# Patient Record
Sex: FEMALE | Race: WHITE | NOT HISPANIC OR LATINO | Employment: FULL TIME | URBAN - METROPOLITAN AREA
[De-identification: names, ages, dates, MRNs, and addresses within clinical notes are randomized per-mention and may not be internally consistent; named-entity substitution may affect disease eponyms.]

---

## 2018-04-09 ENCOUNTER — OFFICE VISIT (OUTPATIENT)
Dept: OPHTHALMOLOGY | Facility: CLINIC | Age: 73
End: 2018-04-09
Payer: COMMERCIAL

## 2018-04-09 DIAGNOSIS — H25.13 CATARACT, NUCLEAR SCLEROTIC SENILE, BILATERAL: Primary | ICD-10-CM

## 2018-04-09 DIAGNOSIS — H52.03 HYPEROPIA, BILATERAL: ICD-10-CM

## 2018-04-09 DIAGNOSIS — H52.4 BILATERAL PRESBYOPIA: ICD-10-CM

## 2018-04-09 PROCEDURE — 99999 PR PBB SHADOW E&M-EST. PATIENT-LVL I: CPT | Mod: PBBFAC,,, | Performed by: OPTOMETRIST

## 2018-04-09 PROCEDURE — 92004 COMPRE OPH EXAM NEW PT 1/>: CPT | Mod: S$GLB,,, | Performed by: OPTOMETRIST

## 2018-04-09 PROCEDURE — 92015 DETERMINE REFRACTIVE STATE: CPT | Mod: S$GLB,,, | Performed by: OPTOMETRIST

## 2018-04-09 RX ORDER — LOSARTAN POTASSIUM 50 MG/1
50 TABLET ORAL DAILY
COMMUNITY
End: 2022-06-08

## 2018-04-09 NOTE — PROGRESS NOTES
HPI     Eye strain on the computer. Last eye exam 08/25/2014 TRF. Update glasses   RX. No dilation today.    Last edited by Sofiya Graham on 4/9/2018 10:53 AM. (History)            Assessment /Plan     For exam results, see Encounter Report.    Cataract, nuclear sclerotic senile, bilateral    Hyperopia, bilateral    Bilateral presbyopia      Moderate cataracts OU, not surgical    Dispense Final Rx for glasses.  RTC 1 year  Discussed above and answered questions.

## 2021-04-20 ENCOUNTER — OFFICE VISIT (OUTPATIENT)
Dept: OPHTHALMOLOGY | Facility: CLINIC | Age: 76
End: 2021-04-20
Payer: COMMERCIAL

## 2021-04-20 DIAGNOSIS — H52.7 REFRACTIVE ERRORS: ICD-10-CM

## 2021-04-20 DIAGNOSIS — H25.13 CATARACT, NUCLEAR SCLEROTIC SENILE, BILATERAL: Primary | ICD-10-CM

## 2021-04-20 DIAGNOSIS — H04.123 DRY EYES, BILATERAL: ICD-10-CM

## 2021-04-20 PROCEDURE — 92004 PR EYE EXAM, NEW PATIENT,COMPREHESV: ICD-10-PCS | Mod: S$GLB,,, | Performed by: OPTOMETRIST

## 2021-04-20 PROCEDURE — 92015 PR REFRACTION: ICD-10-PCS | Mod: S$GLB,,, | Performed by: OPTOMETRIST

## 2021-04-20 PROCEDURE — 92004 COMPRE OPH EXAM NEW PT 1/>: CPT | Mod: S$GLB,,, | Performed by: OPTOMETRIST

## 2021-04-20 PROCEDURE — 99999 PR PBB SHADOW E&M-NEW PATIENT-LVL II: ICD-10-PCS | Mod: PBBFAC,,, | Performed by: OPTOMETRIST

## 2021-04-20 PROCEDURE — 92015 DETERMINE REFRACTIVE STATE: CPT | Mod: S$GLB,,, | Performed by: OPTOMETRIST

## 2021-04-20 PROCEDURE — 99999 PR PBB SHADOW E&M-NEW PATIENT-LVL II: CPT | Mod: PBBFAC,,, | Performed by: OPTOMETRIST

## 2021-05-04 ENCOUNTER — OFFICE VISIT (OUTPATIENT)
Dept: OPHTHALMOLOGY | Facility: CLINIC | Age: 76
End: 2021-05-04
Payer: COMMERCIAL

## 2021-05-04 DIAGNOSIS — H52.7 REFRACTIVE ERRORS: Primary | ICD-10-CM

## 2021-05-04 PROCEDURE — 99499 NO LOS: ICD-10-PCS | Mod: S$GLB,,, | Performed by: OPTOMETRIST

## 2021-05-04 PROCEDURE — 99499 UNLISTED E&M SERVICE: CPT | Mod: S$GLB,,, | Performed by: OPTOMETRIST

## 2021-10-27 ENCOUNTER — OFFICE VISIT (OUTPATIENT)
Dept: CARDIOLOGY | Facility: CLINIC | Age: 76
End: 2021-10-27
Payer: COMMERCIAL

## 2021-10-27 ENCOUNTER — HOSPITAL ENCOUNTER (OUTPATIENT)
Dept: RADIOLOGY | Facility: HOSPITAL | Age: 76
Discharge: HOME | End: 2021-10-27
Attending: INTERNAL MEDICINE
Payer: COMMERCIAL

## 2021-10-27 ENCOUNTER — OFFICE VISIT (OUTPATIENT)
Dept: PULMONOLOGY | Facility: CLINIC | Age: 76
End: 2021-10-27
Payer: COMMERCIAL

## 2021-10-27 ENCOUNTER — HOSPITAL ENCOUNTER (OUTPATIENT)
Dept: CARDIOLOGY | Facility: HOSPITAL | Age: 76
Discharge: HOME | End: 2021-10-27
Attending: INTERNAL MEDICINE
Payer: COMMERCIAL

## 2021-10-27 VITALS
SYSTOLIC BLOOD PRESSURE: 110 MMHG | DIASTOLIC BLOOD PRESSURE: 76 MMHG | WEIGHT: 149.06 LBS | OXYGEN SATURATION: 96 % | RESPIRATION RATE: 17 BRPM | HEART RATE: 92 BPM

## 2021-10-27 VITALS
SYSTOLIC BLOOD PRESSURE: 116 MMHG | HEART RATE: 92 BPM | WEIGHT: 149.06 LBS | OXYGEN SATURATION: 96 % | DIASTOLIC BLOOD PRESSURE: 68 MMHG

## 2021-10-27 DIAGNOSIS — R07.9 CHEST PAIN, UNSPECIFIED TYPE: ICD-10-CM

## 2021-10-27 DIAGNOSIS — I10 HYPERTENSION, UNSPECIFIED TYPE: ICD-10-CM

## 2021-10-27 DIAGNOSIS — E78.5 HYPERLIPIDEMIA, UNSPECIFIED HYPERLIPIDEMIA TYPE: ICD-10-CM

## 2021-10-27 DIAGNOSIS — E78.5 DYSLIPIDEMIA: ICD-10-CM

## 2021-10-27 DIAGNOSIS — R05.3 CHRONIC COUGH: ICD-10-CM

## 2021-10-27 DIAGNOSIS — R05.3 CHRONIC COUGH: Primary | ICD-10-CM

## 2021-10-27 DIAGNOSIS — R06.09 DYSPNEA ON EXERTION: ICD-10-CM

## 2021-10-27 DIAGNOSIS — R07.9 CHEST PAIN, UNSPECIFIED TYPE: Primary | ICD-10-CM

## 2021-10-27 DIAGNOSIS — R06.02 SOBOE (SHORTNESS OF BREATH ON EXERTION): ICD-10-CM

## 2021-10-27 PROCEDURE — 71046 XR CHEST PA AND LATERAL: ICD-10-PCS | Mod: 26,,, | Performed by: RADIOLOGY

## 2021-10-27 PROCEDURE — 71046 X-RAY EXAM CHEST 2 VIEWS: CPT | Mod: TC

## 2021-10-27 PROCEDURE — 1160F RVW MEDS BY RX/DR IN RCRD: CPT | Mod: CPTII,S$GLB,, | Performed by: INTERNAL MEDICINE

## 2021-10-27 PROCEDURE — 99999 PR PBB SHADOW E&M-EST. PATIENT-LVL III: ICD-10-PCS | Mod: PBBFAC,,, | Performed by: INTERNAL MEDICINE

## 2021-10-27 PROCEDURE — 3288F FALL RISK ASSESSMENT DOCD: CPT | Mod: CPTII,S$GLB,, | Performed by: INTERNAL MEDICINE

## 2021-10-27 PROCEDURE — 3078F DIAST BP <80 MM HG: CPT | Mod: CPTII,S$GLB,, | Performed by: INTERNAL MEDICINE

## 2021-10-27 PROCEDURE — 99204 OFFICE O/P NEW MOD 45 MIN: CPT | Mod: S$GLB,,, | Performed by: INTERNAL MEDICINE

## 2021-10-27 PROCEDURE — 3074F SYST BP LT 130 MM HG: CPT | Mod: CPTII,S$GLB,, | Performed by: INTERNAL MEDICINE

## 2021-10-27 PROCEDURE — 71046 X-RAY EXAM CHEST 2 VIEWS: CPT | Mod: 26,,, | Performed by: RADIOLOGY

## 2021-10-27 PROCEDURE — 1160F PR REVIEW ALL MEDS BY PRESCRIBER/CLIN PHARMACIST DOCUMENTED: ICD-10-PCS | Mod: CPTII,S$GLB,, | Performed by: INTERNAL MEDICINE

## 2021-10-27 PROCEDURE — 3074F PR MOST RECENT SYSTOLIC BLOOD PRESSURE < 130 MM HG: ICD-10-PCS | Mod: CPTII,S$GLB,, | Performed by: INTERNAL MEDICINE

## 2021-10-27 PROCEDURE — 1101F PT FALLS ASSESS-DOCD LE1/YR: CPT | Mod: CPTII,S$GLB,, | Performed by: INTERNAL MEDICINE

## 2021-10-27 PROCEDURE — 1159F PR MEDICATION LIST DOCUMENTED IN MEDICAL RECORD: ICD-10-PCS | Mod: CPTII,S$GLB,, | Performed by: INTERNAL MEDICINE

## 2021-10-27 PROCEDURE — 99999 PR PBB SHADOW E&M-EST. PATIENT-LVL III: CPT | Mod: PBBFAC,,, | Performed by: INTERNAL MEDICINE

## 2021-10-27 PROCEDURE — 3078F PR MOST RECENT DIASTOLIC BLOOD PRESSURE < 80 MM HG: ICD-10-PCS | Mod: CPTII,S$GLB,, | Performed by: INTERNAL MEDICINE

## 2021-10-27 PROCEDURE — 99204 PR OFFICE/OUTPT VISIT, NEW, LEVL IV, 45-59 MIN: ICD-10-PCS | Mod: S$GLB,,, | Performed by: INTERNAL MEDICINE

## 2021-10-27 PROCEDURE — 1101F PR PT FALLS ASSESS DOC 0-1 FALLS W/OUT INJ PAST YR: ICD-10-PCS | Mod: CPTII,S$GLB,, | Performed by: INTERNAL MEDICINE

## 2021-10-27 PROCEDURE — 93010 ELECTROCARDIOGRAM REPORT: CPT | Mod: ,,, | Performed by: INTERNAL MEDICINE

## 2021-10-27 PROCEDURE — 1159F MED LIST DOCD IN RCRD: CPT | Mod: CPTII,S$GLB,, | Performed by: INTERNAL MEDICINE

## 2021-10-27 PROCEDURE — 3288F PR FALLS RISK ASSESSMENT DOCUMENTED: ICD-10-PCS | Mod: CPTII,S$GLB,, | Performed by: INTERNAL MEDICINE

## 2021-10-27 PROCEDURE — 93005 ELECTROCARDIOGRAM TRACING: CPT

## 2021-10-27 PROCEDURE — 93010 EKG 12-LEAD: ICD-10-PCS | Mod: ,,, | Performed by: INTERNAL MEDICINE

## 2021-10-27 RX ORDER — ALBUTEROL SULFATE 0.83 MG/ML
2.5 SOLUTION RESPIRATORY (INHALATION) EVERY 6 HOURS PRN
COMMUNITY
Start: 2021-09-20

## 2021-10-28 ENCOUNTER — HOSPITAL ENCOUNTER (OUTPATIENT)
Dept: CARDIOLOGY | Facility: HOSPITAL | Age: 76
Discharge: HOME | End: 2021-10-28
Attending: INTERNAL MEDICINE
Payer: COMMERCIAL

## 2021-10-28 VITALS — BODY MASS INDEX: 24.83 KG/M2 | HEIGHT: 65 IN | WEIGHT: 149 LBS

## 2021-10-28 DIAGNOSIS — I10 HYPERTENSION, UNSPECIFIED TYPE: ICD-10-CM

## 2021-10-28 DIAGNOSIS — R07.9 CHEST PAIN, UNSPECIFIED TYPE: ICD-10-CM

## 2021-10-28 DIAGNOSIS — R06.09 DYSPNEA ON EXERTION: ICD-10-CM

## 2021-10-28 DIAGNOSIS — E78.5 HYPERLIPIDEMIA, UNSPECIFIED HYPERLIPIDEMIA TYPE: ICD-10-CM

## 2021-10-28 LAB
AORTIC ROOT ANNULUS: 3.05 CM
ASCENDING AORTA: 3.78 CM
AV INDEX (PROSTH): 0.86
AV MEAN GRADIENT: 5 MMHG
AV PEAK GRADIENT: 10 MMHG
AV VALVE AREA: 3.02 CM2
AV VELOCITY RATIO: 0.86
BSA FOR ECHO PROCEDURE: 1.76 M2
CV ECHO LV RWT: 0.62 CM
DOP CALC AO PEAK VEL: 1.55 M/S
DOP CALC AO VTI: 30.06 CM
DOP CALC LVOT AREA: 3.5 CM2
DOP CALC LVOT DIAMETER: 2.11 CM
DOP CALC LVOT PEAK VEL: 1.33 M/S
DOP CALC LVOT STROKE VOLUME: 90.83 CM3
DOP CALC RVOT PEAK VEL: 0.68 M/S
DOP CALC RVOT VTI: 14.07 CM
DOP CALCLVOT PEAK VEL VTI: 25.99 CM
E WAVE DECELERATION TIME: 303.81 MSEC
E/A RATIO: 0.68
E/E' RATIO: 5 M/S
ECHO LV POSTERIOR WALL: 1.26 CM (ref 0.6–1.1)
EJECTION FRACTION: 60 %
FRACTIONAL SHORTENING: 33 % (ref 28–44)
INTERVENTRICULAR SEPTUM: 1.3 CM (ref 0.6–1.1)
IVRT: 99.9 MSEC
LA MAJOR: 5.61 CM
LA MINOR: 5.49 CM
LA WIDTH: 2.61 CM
LEFT ATRIUM SIZE: 2.93 CM
LEFT ATRIUM VOLUME INDEX: 20.6 ML/M2
LEFT ATRIUM VOLUME: 36.07 CM3
LEFT INTERNAL DIMENSION IN SYSTOLE: 2.7 CM (ref 2.1–4)
LEFT VENTRICLE DIASTOLIC VOLUME INDEX: 41.47 ML/M2
LEFT VENTRICLE DIASTOLIC VOLUME: 72.58 ML
LEFT VENTRICLE MASS INDEX: 106 G/M2
LEFT VENTRICLE SYSTOLIC VOLUME INDEX: 15.5 ML/M2
LEFT VENTRICLE SYSTOLIC VOLUME: 27.14 ML
LEFT VENTRICULAR INTERNAL DIMENSION IN DIASTOLE: 4.06 CM (ref 3.5–6)
LEFT VENTRICULAR MASS: 186.3 G
LV LATERAL E/E' RATIO: 4.55 M/S
LV SEPTAL E/E' RATIO: 5.56 M/S
MV PEAK A VEL: 0.73 M/S
MV PEAK E VEL: 0.5 M/S
MV STENOSIS PRESSURE HALF TIME: 88.11 MS
MV VALVE AREA P 1/2 METHOD: 2.5 CM2
PISA TR MAX VEL: 2.41 M/S
PV MEAN GRADIENT: 1 MMHG
PV PEAK VELOCITY: 0.78 CM/S
RA MAJOR: 5.1 CM
RA PRESSURE: 3 MMHG
RA WIDTH: 2.96 CM
RIGHT VENTRICULAR END-DIASTOLIC DIMENSION: 3.69 CM
SINUS: 3.26 CM
STJ: 3.37 CM
TDI LATERAL: 0.11 M/S
TDI SEPTAL: 0.09 M/S
TDI: 0.1 M/S
TR MAX PG: 23 MMHG
TRICUSPID ANNULAR PLANE SYSTOLIC EXCURSION: 1.76 CM
TV REST PULMONARY ARTERY PRESSURE: 26 MMHG

## 2021-10-28 PROCEDURE — 93306 ECHO (CUPID ONLY): ICD-10-PCS | Mod: 26,,, | Performed by: INTERNAL MEDICINE

## 2021-10-28 PROCEDURE — 93306 TTE W/DOPPLER COMPLETE: CPT | Mod: 26,,, | Performed by: INTERNAL MEDICINE

## 2021-10-28 PROCEDURE — 93306 TTE W/DOPPLER COMPLETE: CPT

## 2021-10-29 ENCOUNTER — TELEPHONE (OUTPATIENT)
Dept: CARDIOLOGY | Facility: CLINIC | Age: 76
End: 2021-10-29
Payer: MEDICARE

## 2021-10-30 DIAGNOSIS — R76.8 ELEVATED IGE LEVEL: Primary | ICD-10-CM

## 2021-11-04 ENCOUNTER — LAB VISIT (OUTPATIENT)
Dept: LAB | Facility: HOSPITAL | Age: 76
End: 2021-11-04
Attending: INTERNAL MEDICINE
Payer: MEDICARE

## 2021-11-04 ENCOUNTER — CLINICAL SUPPORT (OUTPATIENT)
Dept: PULMONOLOGY | Facility: CLINIC | Age: 76
End: 2021-11-04
Payer: COMMERCIAL

## 2021-11-04 VITALS — BODY MASS INDEX: 25.48 KG/M2 | HEIGHT: 64 IN | WEIGHT: 149.25 LBS

## 2021-11-04 DIAGNOSIS — E78.5 HYPERLIPIDEMIA, UNSPECIFIED HYPERLIPIDEMIA TYPE: ICD-10-CM

## 2021-11-04 DIAGNOSIS — R06.09 DYSPNEA ON EXERTION: ICD-10-CM

## 2021-11-04 DIAGNOSIS — I10 HYPERTENSION, UNSPECIFIED TYPE: ICD-10-CM

## 2021-11-04 DIAGNOSIS — R06.02 SOBOE (SHORTNESS OF BREATH ON EXERTION): ICD-10-CM

## 2021-11-04 DIAGNOSIS — R05.3 CHRONIC COUGH: ICD-10-CM

## 2021-11-04 DIAGNOSIS — R07.9 CHEST PAIN, UNSPECIFIED TYPE: ICD-10-CM

## 2021-11-04 LAB
CHOLEST SERPL-MCNC: 246 MG/DL (ref 120–199)
CHOLEST/HDLC SERPL: 4.7 {RATIO} (ref 2–5)
HDLC SERPL-MCNC: 52 MG/DL (ref 40–75)
HDLC SERPL: 21.1 % (ref 20–50)
LDLC SERPL CALC-MCNC: 176.4 MG/DL (ref 63–159)
NONHDLC SERPL-MCNC: 194 MG/DL
TRIGL SERPL-MCNC: 88 MG/DL (ref 30–150)

## 2021-11-04 PROCEDURE — 94726 PLETHYSMOGRAPHY LUNG VOLUMES: CPT | Mod: S$GLB,,, | Performed by: INTERNAL MEDICINE

## 2021-11-04 PROCEDURE — 80061 LIPID PANEL: CPT | Performed by: INTERNAL MEDICINE

## 2021-11-04 PROCEDURE — 94618 PULMONARY STRESS TESTING: CPT | Mod: S$GLB,,, | Performed by: INTERNAL MEDICINE

## 2021-11-04 PROCEDURE — 36415 COLL VENOUS BLD VENIPUNCTURE: CPT | Performed by: INTERNAL MEDICINE

## 2021-11-04 PROCEDURE — 94729 DIFFUSING CAPACITY: CPT | Mod: S$GLB,,, | Performed by: INTERNAL MEDICINE

## 2021-11-04 PROCEDURE — 94010 BREATHING CAPACITY TEST: ICD-10-PCS | Mod: S$GLB,,, | Performed by: INTERNAL MEDICINE

## 2021-11-04 PROCEDURE — 94726 PULM FUNCT TST PLETHYSMOGRAP: ICD-10-PCS | Mod: S$GLB,,, | Performed by: INTERNAL MEDICINE

## 2021-11-04 PROCEDURE — 94618 PULMONARY STRESS TESTING: ICD-10-PCS | Mod: S$GLB,,, | Performed by: INTERNAL MEDICINE

## 2021-11-04 PROCEDURE — 94010 BREATHING CAPACITY TEST: CPT | Mod: S$GLB,,, | Performed by: INTERNAL MEDICINE

## 2021-11-04 PROCEDURE — 94729 PR C02/MEMBANE DIFFUSE CAPACITY: ICD-10-PCS | Mod: S$GLB,,, | Performed by: INTERNAL MEDICINE

## 2021-11-05 LAB
BRPFT: NORMAL
DLCO ADJ PRE: 7.11 ML/(MIN*MMHG)
DLCO SINGLE BREATH LLN: 14.24
DLCO SINGLE BREATH PRE REF: 33.6 %
DLCO SINGLE BREATH REF: 19.98
DLCOC SBVA LLN: 2.64
DLCOC SBVA PRE REF: 81.1 %
DLCOC SBVA REF: 4.07
DLCOC SINGLE BREATH LLN: 14.24
DLCOC SINGLE BREATH PRE REF: 35.6 %
DLCOC SINGLE BREATH REF: 19.98
DLCOVA LLN: 2.64
DLCOVA PRE REF: 76.5 %
DLCOVA PRE: 3.12 ML/(MIN*MMHG*L)
DLCOVA REF: 4.07
DLVAADJ PRE: 3.3 ML/(MIN*MMHG*L)
ERV LLN: -16449.47
ERV PRE REF: 150.6 %
ERV REF: 0.53
FEF 25 75 LLN: 0.67
FEF 25 75 PRE REF: 97.7 %
FEF 25 75 REF: 1.59
FEV1 FVC LLN: 63
FEV1 FVC PRE REF: 104.7 %
FEV1 FVC REF: 77
FEV1 LLN: 1.38
FEV1 PRE REF: 72.8 %
FEV1 REF: 1.95
FRCPLETH LLN: 1.88
FRCPLETH PREREF: 105.4 %
FRCPLETH REF: 2.71
FVC LLN: 1.81
FVC PRE REF: 68.8 %
FVC REF: 2.56
IVC PRE: 1.29 L
IVC SINGLE BREATH LLN: 1.81
IVC SINGLE BREATH PRE REF: 50.4 %
IVC SINGLE BREATH REF: 2.56
MVV LLN: 63
MVV PRE REF: 59.5 %
MVV REF: 74
PEF LLN: 3.25
PEF PRE REF: 83.4 %
PEF REF: 4.95
PRE DLCO: 6.71 ML/(MIN*MMHG)
PRE ERV: 0.79 L
PRE FEF 25 75: 1.55 L/S
PRE FET 100: 6.78 SEC
PRE FEV1 FVC: 80.77 %
PRE FEV1: 1.42 L
PRE FRC PL: 2.85 L
PRE FVC: 1.76 L
PRE MVV: 44 L/MIN
PRE PEF: 4.12 L/S
PRE RV: 1.98 L
PRE TLC: 3.78 L
RAW LLN: 3.06
RAW PRE REF: 47.7 %
RAW PRE: 1.46 CMH2O*S/L
RAW REF: 3.06
RV LLN: 1.6
RV PRE REF: 90.9 %
RV REF: 2.18
RVTLC LLN: 36
RVTLC PRE REF: 115.2 %
RVTLC PRE: 52.38 %
RVTLC REF: 45
TLC LLN: 3.91
TLC PRE REF: 77.2 %
TLC REF: 4.9
VA PRE: 2.16 L
VA SINGLE BREATH LLN: 4.75
VA SINGLE BREATH PRE REF: 45.4 %
VA SINGLE BREATH REF: 4.75
VC LLN: 1.81
VC PRE REF: 70.3 %
VC PRE: 1.8 L
VC REF: 2.56
VTGRAWPRE: 2.84 L

## 2021-11-15 ENCOUNTER — TELEPHONE (OUTPATIENT)
Dept: CARDIOLOGY | Facility: CLINIC | Age: 76
End: 2021-11-15
Payer: MEDICARE

## 2021-11-16 ENCOUNTER — TELEPHONE (OUTPATIENT)
Dept: CARDIOLOGY | Facility: CLINIC | Age: 76
End: 2021-11-16
Payer: MEDICARE

## 2021-11-16 DIAGNOSIS — E78.5 HYPERLIPIDEMIA, UNSPECIFIED HYPERLIPIDEMIA TYPE: Primary | ICD-10-CM

## 2021-11-29 ENCOUNTER — HOSPITAL ENCOUNTER (OUTPATIENT)
Dept: CARDIOLOGY | Facility: HOSPITAL | Age: 76
Discharge: HOME | End: 2021-11-29
Attending: INTERNAL MEDICINE
Payer: COMMERCIAL

## 2021-11-29 ENCOUNTER — HOSPITAL ENCOUNTER (OUTPATIENT)
Dept: RADIOLOGY | Facility: HOSPITAL | Age: 76
Discharge: HOME | End: 2021-11-29
Attending: INTERNAL MEDICINE
Payer: COMMERCIAL

## 2021-11-29 DIAGNOSIS — R06.09 DYSPNEA ON EXERTION: ICD-10-CM

## 2021-11-29 DIAGNOSIS — I10 HYPERTENSION, UNSPECIFIED TYPE: ICD-10-CM

## 2021-11-29 DIAGNOSIS — R07.9 CHEST PAIN, UNSPECIFIED TYPE: ICD-10-CM

## 2021-11-29 DIAGNOSIS — E78.5 HYPERLIPIDEMIA, UNSPECIFIED HYPERLIPIDEMIA TYPE: ICD-10-CM

## 2021-11-29 LAB
CV STRESS BASE HR: 79 BPM
DIASTOLIC BLOOD PRESSURE: 74 MMHG
NUC REST EJECTION FRACTION: 88
NUC STRESS EJECTION FRACTION: 86 %
OHS CV CPX 85 PERCENT MAX PREDICTED HEART RATE MALE: 117
OHS CV CPX MAX PREDICTED HEART RATE: 137
OHS CV CPX PATIENT IS FEMALE: 1
OHS CV CPX PATIENT IS MALE: 0
OHS CV CPX PEAK DIASTOLIC BLOOD PRESSURE: 68 MMHG
OHS CV CPX PEAK HEAR RATE: 102 BPM
OHS CV CPX PEAK RATE PRESSURE PRODUCT: NORMAL
OHS CV CPX PEAK SYSTOLIC BLOOD PRESSURE: 121 MMHG
OHS CV CPX PERCENT MAX PREDICTED HEART RATE ACHIEVED: 74
OHS CV CPX RATE PRESSURE PRODUCT PRESENTING: NORMAL
STRESS ECHO POST EXERCISE DUR SEC: 55 SECONDS
SYSTOLIC BLOOD PRESSURE: 136 MMHG

## 2021-11-29 PROCEDURE — 93016 CV STRESS TEST SUPVJ ONLY: CPT | Mod: ,,, | Performed by: INTERNAL MEDICINE

## 2021-11-29 PROCEDURE — 63600175 PHARM REV CODE 636 W HCPCS: Performed by: INTERNAL MEDICINE

## 2021-11-29 PROCEDURE — 93018 CV STRESS TEST I&R ONLY: CPT | Mod: ,,, | Performed by: INTERNAL MEDICINE

## 2021-11-29 PROCEDURE — 78452 STRESS TEST WITH MYOCARDIAL PERFUSION (CUPID ONLY): ICD-10-PCS | Mod: 26,,, | Performed by: INTERNAL MEDICINE

## 2021-11-29 PROCEDURE — 78452 HT MUSCLE IMAGE SPECT MULT: CPT | Mod: 26,,, | Performed by: INTERNAL MEDICINE

## 2021-11-29 PROCEDURE — 93018 STRESS TEST WITH MYOCARDIAL PERFUSION (CUPID ONLY): ICD-10-PCS | Mod: ,,, | Performed by: INTERNAL MEDICINE

## 2021-11-29 PROCEDURE — 78452 HT MUSCLE IMAGE SPECT MULT: CPT

## 2021-11-29 PROCEDURE — 93016 STRESS TEST WITH MYOCARDIAL PERFUSION (CUPID ONLY): ICD-10-PCS | Mod: ,,, | Performed by: INTERNAL MEDICINE

## 2021-11-29 PROCEDURE — A9502 TC99M TETROFOSMIN: HCPCS

## 2021-11-29 PROCEDURE — 93017 CV STRESS TEST TRACING ONLY: CPT

## 2021-11-29 RX ORDER — AMINOPHYLLINE 25 MG/ML
75 INJECTION, SOLUTION INTRAVENOUS
Status: COMPLETED | OUTPATIENT
Start: 2021-11-29 | End: 2021-11-29

## 2021-11-29 RX ORDER — REGADENOSON 0.08 MG/ML
0.4 INJECTION, SOLUTION INTRAVENOUS ONCE
Status: COMPLETED | OUTPATIENT
Start: 2021-11-29 | End: 2021-11-29

## 2021-11-29 RX ADMIN — AMINOPHYLLINE 75 MG: 25 INJECTION, SOLUTION INTRAVENOUS at 01:11

## 2021-11-29 RX ADMIN — REGADENOSON 0.4 MG: 0.08 INJECTION, SOLUTION INTRAVENOUS at 12:11

## 2021-11-30 ENCOUNTER — TELEPHONE (OUTPATIENT)
Dept: CARDIOLOGY | Facility: CLINIC | Age: 76
End: 2021-11-30
Payer: MEDICARE

## 2021-12-14 ENCOUNTER — OFFICE VISIT (OUTPATIENT)
Dept: PULMONOLOGY | Facility: CLINIC | Age: 76
End: 2021-12-14
Payer: COMMERCIAL

## 2021-12-14 ENCOUNTER — LAB VISIT (OUTPATIENT)
Dept: LAB | Facility: HOSPITAL | Age: 76
End: 2021-12-14
Attending: FAMILY MEDICINE
Payer: MEDICARE

## 2021-12-14 ENCOUNTER — IMMUNIZATION (OUTPATIENT)
Dept: PHARMACY | Facility: CLINIC | Age: 76
End: 2021-12-14
Payer: MEDICARE

## 2021-12-14 VITALS
RESPIRATION RATE: 20 BRPM | OXYGEN SATURATION: 98 % | HEIGHT: 63 IN | SYSTOLIC BLOOD PRESSURE: 132 MMHG | WEIGHT: 151.88 LBS | BODY MASS INDEX: 26.91 KG/M2 | DIASTOLIC BLOOD PRESSURE: 74 MMHG | HEART RATE: 80 BPM

## 2021-12-14 DIAGNOSIS — J98.4 RESTRICTIVE LUNG DISEASE: ICD-10-CM

## 2021-12-14 DIAGNOSIS — J84.9 ILD (INTERSTITIAL LUNG DISEASE): ICD-10-CM

## 2021-12-14 DIAGNOSIS — J84.9 ILD (INTERSTITIAL LUNG DISEASE): Primary | ICD-10-CM

## 2021-12-14 DIAGNOSIS — R76.8 ELEVATED IGE LEVEL: ICD-10-CM

## 2021-12-14 DIAGNOSIS — Z23 NEED FOR VACCINATION AGAINST STREPTOCOCCUS PNEUMONIAE USING PNEUMOCOCCAL CONJUGATE VACCINE 13: ICD-10-CM

## 2021-12-14 LAB
CRP SERPL-MCNC: 46.2 MG/L (ref 0–8.2)
ERYTHROCYTE [SEDIMENTATION RATE] IN BLOOD BY WESTERGREN METHOD: 33 MM/HR (ref 0–36)
RHEUMATOID FACT SERPL-ACNC: 140 IU/ML (ref 0–15)

## 2021-12-14 PROCEDURE — 99214 OFFICE O/P EST MOD 30 MIN: CPT | Mod: S$GLB,,, | Performed by: INTERNAL MEDICINE

## 2021-12-14 PROCEDURE — 86038 ANTINUCLEAR ANTIBODIES: CPT | Performed by: INTERNAL MEDICINE

## 2021-12-14 PROCEDURE — 82164 ANGIOTENSIN I ENZYME TEST: CPT | Performed by: INTERNAL MEDICINE

## 2021-12-14 PROCEDURE — 86235 NUCLEAR ANTIGEN ANTIBODY: CPT | Mod: 59 | Performed by: INTERNAL MEDICINE

## 2021-12-14 PROCEDURE — 99214 PR OFFICE/OUTPT VISIT, EST, LEVL IV, 30-39 MIN: ICD-10-PCS | Mod: S$GLB,,, | Performed by: INTERNAL MEDICINE

## 2021-12-14 PROCEDURE — 99999 PR PBB SHADOW E&M-EST. PATIENT-LVL IV: CPT | Mod: PBBFAC,,, | Performed by: INTERNAL MEDICINE

## 2021-12-14 PROCEDURE — 99999 PR PBB SHADOW E&M-EST. PATIENT-LVL IV: ICD-10-PCS | Mod: PBBFAC,,, | Performed by: INTERNAL MEDICINE

## 2021-12-14 PROCEDURE — 86039 ANTINUCLEAR ANTIBODIES (ANA): CPT | Performed by: INTERNAL MEDICINE

## 2021-12-14 PROCEDURE — 85652 RBC SED RATE AUTOMATED: CPT | Performed by: INTERNAL MEDICINE

## 2021-12-14 PROCEDURE — 86235 NUCLEAR ANTIGEN ANTIBODY: CPT | Performed by: INTERNAL MEDICINE

## 2021-12-14 PROCEDURE — 86431 RHEUMATOID FACTOR QUANT: CPT | Performed by: INTERNAL MEDICINE

## 2021-12-14 PROCEDURE — 86140 C-REACTIVE PROTEIN: CPT | Performed by: INTERNAL MEDICINE

## 2021-12-15 LAB
ANA PATTERN 1: NORMAL
ANA SER QL IF: POSITIVE
ANA TITR SER IF: NORMAL {TITER}

## 2021-12-16 DIAGNOSIS — R76.8 RHEUMATOID FACTOR POSITIVE: Primary | ICD-10-CM

## 2021-12-16 LAB
ACE SERPL-CCNC: 27 U/L (ref 16–85)
ANTI SM ANTIBODY: 0.07 RATIO (ref 0–0.99)
ANTI SM/RNP ANTIBODY: 0.06 RATIO (ref 0–0.99)
ANTI-SM INTERPRETATION: NEGATIVE
ANTI-SM/RNP INTERPRETATION: NEGATIVE
ANTI-SSA ANTIBODY: 0.08 RATIO (ref 0–0.99)
ANTI-SSA ANTIBODY: 0.08 RATIO (ref 0–0.99)
ANTI-SSA INTERPRETATION: NEGATIVE
ANTI-SSA INTERPRETATION: NEGATIVE
ANTI-SSB ANTIBODY: 0.08 RATIO (ref 0–0.99)
ANTI-SSB ANTIBODY: 0.08 RATIO (ref 0–0.99)
ANTI-SSB INTERPRETATION: NEGATIVE
ANTI-SSB INTERPRETATION: NEGATIVE
DSDNA AB SER-ACNC: NORMAL [IU]/ML

## 2021-12-17 ENCOUNTER — LAB VISIT (OUTPATIENT)
Dept: LAB | Facility: HOSPITAL | Age: 76
End: 2021-12-17
Attending: INTERNAL MEDICINE
Payer: MEDICARE

## 2021-12-17 DIAGNOSIS — E78.5 HYPERLIPIDEMIA, UNSPECIFIED HYPERLIPIDEMIA TYPE: ICD-10-CM

## 2021-12-17 LAB
ALT SERPL W/O P-5'-P-CCNC: 14 U/L (ref 10–44)
CHOLEST SERPL-MCNC: 167 MG/DL (ref 120–199)
CHOLEST/HDLC SERPL: 3.4 {RATIO} (ref 2–5)
HDLC SERPL-MCNC: 49 MG/DL (ref 40–75)
HDLC SERPL: 29.3 % (ref 20–50)
LDLC SERPL CALC-MCNC: 103.6 MG/DL (ref 63–159)
NONHDLC SERPL-MCNC: 118 MG/DL
TRIGL SERPL-MCNC: 72 MG/DL (ref 30–150)

## 2021-12-17 PROCEDURE — 80061 LIPID PANEL: CPT | Performed by: INTERNAL MEDICINE

## 2021-12-17 PROCEDURE — 84460 ALANINE AMINO (ALT) (SGPT): CPT | Performed by: INTERNAL MEDICINE

## 2021-12-17 PROCEDURE — 36415 COLL VENOUS BLD VENIPUNCTURE: CPT | Performed by: INTERNAL MEDICINE

## 2021-12-20 ENCOUNTER — TELEPHONE (OUTPATIENT)
Dept: CARDIOLOGY | Facility: CLINIC | Age: 76
End: 2021-12-20
Payer: MEDICARE

## 2021-12-23 ENCOUNTER — HOSPITAL ENCOUNTER (OUTPATIENT)
Dept: RADIOLOGY | Facility: HOSPITAL | Age: 76
Discharge: HOME | End: 2021-12-23
Attending: INTERNAL MEDICINE
Payer: COMMERCIAL

## 2021-12-23 DIAGNOSIS — J84.9 ILD (INTERSTITIAL LUNG DISEASE): ICD-10-CM

## 2021-12-23 DIAGNOSIS — J98.4 RESTRICTIVE LUNG DISEASE: ICD-10-CM

## 2021-12-23 PROCEDURE — 71250 CT THORAX DX C-: CPT | Mod: 26,,, | Performed by: RADIOLOGY

## 2021-12-23 PROCEDURE — 71250 CT CHEST WITHOUT CONTRAST: ICD-10-PCS | Mod: 26,,, | Performed by: RADIOLOGY

## 2021-12-23 PROCEDURE — 71250 CT THORAX DX C-: CPT | Mod: TC

## 2021-12-25 DIAGNOSIS — R76.8 RHEUMATOID FACTOR POSITIVE: ICD-10-CM

## 2021-12-25 DIAGNOSIS — J84.9 ILD (INTERSTITIAL LUNG DISEASE): Primary | ICD-10-CM

## 2021-12-27 LAB — PM1 AB SER QL: <20 UNITS

## 2022-01-04 ENCOUNTER — TELEPHONE (OUTPATIENT)
Dept: RHEUMATOLOGY | Facility: CLINIC | Age: 77
End: 2022-01-04
Payer: MEDICARE

## 2022-01-05 ENCOUNTER — TELEPHONE (OUTPATIENT)
Dept: RHEUMATOLOGY | Facility: CLINIC | Age: 77
End: 2022-01-05
Payer: MEDICARE

## 2022-01-05 NOTE — TELEPHONE ENCOUNTER
Attempt to confirm 1-6 appointment with Dr. Banks at [facility]. Voice mail has not been st up .

## 2022-01-06 ENCOUNTER — HOSPITAL ENCOUNTER (OUTPATIENT)
Dept: RADIOLOGY | Facility: HOSPITAL | Age: 77
Discharge: HOME | End: 2022-01-06
Attending: INTERNAL MEDICINE
Payer: COMMERCIAL

## 2022-01-06 ENCOUNTER — TELEPHONE (OUTPATIENT)
Dept: RHEUMATOLOGY | Facility: CLINIC | Age: 77
End: 2022-01-06

## 2022-01-06 ENCOUNTER — OFFICE VISIT (OUTPATIENT)
Dept: RHEUMATOLOGY | Facility: CLINIC | Age: 77
End: 2022-01-06
Payer: COMMERCIAL

## 2022-01-06 VITALS
DIASTOLIC BLOOD PRESSURE: 89 MMHG | HEIGHT: 63 IN | HEART RATE: 88 BPM | SYSTOLIC BLOOD PRESSURE: 137 MMHG | WEIGHT: 149.94 LBS | BODY MASS INDEX: 26.57 KG/M2

## 2022-01-06 DIAGNOSIS — R76.8 RHEUMATOID FACTOR POSITIVE: ICD-10-CM

## 2022-01-06 DIAGNOSIS — Z71.89 COUNSELING ON HEALTH PROMOTION AND DISEASE PREVENTION: ICD-10-CM

## 2022-01-06 DIAGNOSIS — M05.9 SEROPOSITIVE RHEUMATOID ARTHRITIS: Primary | ICD-10-CM

## 2022-01-06 DIAGNOSIS — J84.112 UIP (USUAL INTERSTITIAL PNEUMONITIS): ICD-10-CM

## 2022-01-06 PROCEDURE — 73130 X-RAY EXAM OF HAND: CPT | Mod: TC,50

## 2022-01-06 PROCEDURE — 73130 X-RAY EXAM OF HAND: CPT | Mod: 26,,, | Performed by: RADIOLOGY

## 2022-01-06 PROCEDURE — 99999 PR PBB SHADOW E&M-EST. PATIENT-LVL IV: CPT | Mod: PBBFAC,,, | Performed by: INTERNAL MEDICINE

## 2022-01-06 PROCEDURE — 73130 XR HAND COMPLETE 3 VIEWS BILATERAL: ICD-10-PCS | Mod: 26,,, | Performed by: RADIOLOGY

## 2022-01-06 PROCEDURE — 73630 X-RAY EXAM OF FOOT: CPT | Mod: 26,,, | Performed by: RADIOLOGY

## 2022-01-06 PROCEDURE — 73630 X-RAY EXAM OF FOOT: CPT | Mod: TC,50

## 2022-01-06 PROCEDURE — 99999 PR PBB SHADOW E&M-EST. PATIENT-LVL IV: ICD-10-PCS | Mod: PBBFAC,,, | Performed by: INTERNAL MEDICINE

## 2022-01-06 PROCEDURE — 99205 PR OFFICE/OUTPT VISIT, NEW, LEVL V, 60-74 MIN: ICD-10-PCS | Mod: S$PBB,,, | Performed by: INTERNAL MEDICINE

## 2022-01-06 PROCEDURE — 73630 XR FOOT COMPLETE 3 VIEW BILATERAL: ICD-10-PCS | Mod: 26,,, | Performed by: RADIOLOGY

## 2022-01-06 PROCEDURE — 99205 OFFICE O/P NEW HI 60 MIN: CPT | Mod: S$PBB,,, | Performed by: INTERNAL MEDICINE

## 2022-01-06 RX ORDER — MYCOPHENOLATE MOFETIL 500 MG/1
TABLET ORAL
Qty: 162 TABLET | Refills: 3 | Status: SHIPPED | OUTPATIENT
Start: 2022-01-06 | End: 2022-02-23

## 2022-01-06 NOTE — PATIENT INSTRUCTIONS
Patient Education       Azathioprine (ay za LAKEISHA oh preen)   Brand Names: US Azasan; Imuran   Brand Names: Gwendolyn APO-AzaTHIOprine; AzaTHIOprine-50; Imuran; MYLAN-AzaTHIOprine [DSC]; NU-AzaTHIOprine [DSC]; TEVA-AzaTHIOprine   Warning   · Long-term use may raise your chance of cancer.  · Lymphoma and other cancers have happened in people who take this drug or drugs like it. This has been deadly in some cases. Talk with the doctor.  · A rare type of cancer called hepatosplenic T-cell lymphoma (HSTCL) has happened with this drug. These cases have been deadly. Most of the time, these cases happened in teenagers or young adults. Most of these patients were using this drug to treat certain types of bowel problems like Crohn disease and ulcerative colitis. This drug is not approved for use to treat bowel problems like these. Tell the doctor if you have ever had any type of cancer. Talk with the doctor.    What is this drug used for?   · It is used after a kidney transplant to keep the body from rejecting the kidney.  · It is used to treat rheumatoid arthritis.  · It may be given to you for other reasons. Talk with the doctor.    What do I need to tell my doctor BEFORE I take this drug?   For all uses of this drug:   · If you are allergic to this drug; any part of this drug; or any other drugs, foods, or substances. Tell your doctor about the allergy and what signs you had.  · If you have ever been treated with chlorambucil, cyclophosphamide, or melphalan in the past.  · If you are taking any of these drugs: Cyclosporine or febuxostat.  Rheumatoid arthritis patients:   · If you are pregnant or may be pregnant. Do not take this drug if you are pregnant.  This is not a list of all drugs or health problems that interact with this drug.  Tell your doctor and pharmacist about all of your drugs (prescription or OTC, natural products, vitamins) and health problems. You must check to make sure that it is safe for you to take this  drug with all of your drugs and health problems. Do not start, stop, or change the dose of any drug without checking with your doctor.  What are some things I need to know or do while I take this drug?   · Tell all of your health care providers that you take this drug. This includes your doctors, nurses, pharmacists, and dentists.  · You may have more of a chance of getting an infection. Wash hands often. Stay away from people with infections, colds, or flu. Some infections have been very bad and even deadly.  · You may bleed more easily. Be careful and avoid injury. Use a soft toothbrush and an electric razor.  · If you have thiopurine S-methyltransferase (TPMT) or nucleotide diphosphatase (NUDT15) deficiency, talk with your doctor.  · Have blood work checked as you have been told by the doctor. Talk with the doctor.  · The chance of skin cancer may be raised. Avoid lots of sun, sunlamps, and tanning beds. Use sunscreen and wear clothing and eyewear that protects you from the sun.  · You may need to have your skin checked while you take this drug. Talk with your doctor.  · This drug may cause harm to an unborn baby. If you may become pregnant, you must use birth control while taking this drug. If you get pregnant, call your doctor right away.  · Tell your doctor if you are breast-feeding. You will need to talk about any risks to your baby.    What are some side effects that I need to call my doctor about right away?   WARNING/CAUTION: Even though it may be rare, some people may have very bad and sometimes deadly side effects when taking a drug. Tell your doctor or get medical help right away if you have any of the following signs or symptoms that may be related to a very bad side effect:  · Signs of an allergic reaction, like rash; hives; itching; red, swollen, blistered, or peeling skin with or without fever; wheezing; tightness in the chest or throat; trouble breathing, swallowing, or talking; unusual hoarseness;  or swelling of the mouth, face, lips, tongue, or throat.  · Signs of liver problems like dark urine, feeling tired, not hungry, upset stomach or stomach pain, light-colored stools, throwing up, or yellow skin or eyes.  · Very bad dizziness or passing out.  · Diarrhea.  · Muscle pain or weakness.  · Change in color or size of a mole.  · A skin lump or growth.  · Call your doctor right away if you have a swollen gland, night sweats, shortness of breath, or weight loss without trying.  · Low blood cell counts have happened with this drug. If blood cell counts get very low, this can lead to bleeding problems, infections, or anemia. Call your doctor right away if you have signs of infection like fever, chills, or sore throat; any unexplained bruising or bleeding; or if you feel very tired or weak.  · A very bad brain problem called progressive multifocal leukoencephalopathy (PML) has happened with this drug. It may cause disability or can be deadly. Tell your doctor right away if you have signs like confusion, memory problems, low mood (depression), change in the way you act, change in strength on 1 side is greater than the other, trouble speaking or thinking, change in balance, or change in eyesight.  What are some other side effects of this drug?   All drugs may cause side effects. However, many people have no side effects or only have minor side effects. Call your doctor or get medical help if any of these side effects or any other side effects bother you or do not go away:  · Upset stomach or throwing up.  These are not all of the side effects that may occur. If you have questions about side effects, call your doctor. Call your doctor for medical advice about side effects.  You may report side effects to your national health agency.  You may report side effects to the FDA at 1-673.321.6957. You may also report side effects at https://www.fda.gov/medwatch.  How is this drug best taken?   Use this drug as ordered by  your doctor. Read all information given to you. Follow all instructions closely.  Tablets:   · Take after meals unless your doctor says otherwise.  · Keep taking this drug as you have been told by your doctor or other health care provider, even if you feel well.  Injection:   · It is given as an infusion into a vein over a period of time.  What do I do if I miss a dose?   Tablets:   · Take a missed dose as soon as you think about it.  · If it is close to the time for your next dose, skip the missed dose and go back to your normal time.  · Do not take 2 doses at the same time or extra doses.  Injection:   · Call your doctor to find out what to do.  How do I store and/or throw out this drug?   Tablets:   · Store at room temperature protected from light. Store in a dry place. Do not store in a bathroom.  Injection:   · If you need to store this drug at home, talk with your doctor, nurse, or pharmacist about how to store it.  All products:   · Keep all drugs in a safe place. Keep all drugs out of the reach of children and pets.  · Throw away unused or  drugs. Do not flush down a toilet or pour down a drain unless you are told to do so. Check with your pharmacist if you have questions about the best way to throw out drugs. There may be drug take-back programs in your area.  General drug facts   · If your symptoms or health problems do not get better or if they become worse, call your doctor.  · Do not share your drugs with others and do not take anyone else's drugs.  · Some drugs may have another patient information leaflet. If you have any questions about this drug, please talk with your doctor, nurse, pharmacist, or other health care provider.  · Some drugs may have another patient information leaflet. Check with your pharmacist. If you have any questions about this drug, please talk with your doctor, nurse, pharmacist, or other health care provider.  · If you think there has been an overdose, call your poison  control center or get medical care right away. Be ready to tell or show what was taken, how much, and when it happened.    Consumer Information Use and Disclaimer   This generalized information is a limited summary of diagnosis, treatment, and/or medication information. It is not meant to be comprehensive and should be used as a tool to help the user understand and/or assess potential diagnostic and treatment options. It does NOT include all information about conditions, treatments, medications, side effects, or risks that may apply to a specific patient. It is not intended to be medical advice or a substitute for the medical advice, diagnosis, or treatment of a health care provider based on the health care provider's examination and assessment of a patient's specific and unique circumstances. Patients must speak with a health care provider for complete information about their health, medical questions, and treatment options, including any risks or benefits regarding use of medications. This information does not endorse any treatments or medications as safe, effective, or approved for treating a specific patient. [employer] and its affiliates disclaim any warranty or liability relating to this information or the use thereof. The use of this information is governed by the Terms of Use, available at https://www.[employer]/en/solutions/lexicomp/about/daniel.  Last Reviewed Date   2019-11-22  Copyright   © 2021 [employer] and its affiliates and/or licensors. All rights reserved.  Patient Education       Mycophenolate (natalia alcantara)   Brand Names: US CellCept; CellCept Intravenous; Myfortic   Brand Names: Gwendolyn ACH-Mycophenolate; APO-Mycophenolate; APO-Mycophenolic Acid; CellCept; CellCept IV; CO Mycophenolate [DSC]; JAMP-Mycophenolate; MAR-Mycophenolic Acid; Myfortic; SANDOZ Mycophenolate Mofetil; TEVA-Mycophenolate; VAN-Mycophenolate [DSC]   Warning   · Use of this drug during pregnancy may cause  birth defects or death of the unborn baby. If you are able to become pregnant, you must use birth control that you can trust to prevent pregnancy while taking this drug. Your doctor must talk with you about how to avoid getting pregnant while taking this drug. A pregnancy test will be done right before starting this drug and repeated 8 to 10 days later to show that you are NOT pregnant. If you get pregnant or plan on getting pregnant while taking this drug, call your doctor right away.  · This drug may raise the chance of getting cancer like lymphoma or skin cancer. Call your doctor right away if you have a change in color or size of a mole, a skin lump or growth, a big weight loss, night sweats, or swollen glands.  · You may have more of a chance of getting an infection. Wash hands often. Stay away from people with infections, colds, or flu. Some infections have been very bad and even deadly.  · Call your doctor right away if you have any signs of infection like fever, chills, flu-like signs, very bad sore throat, ear or sinus pain, cough, more sputum or change in color of sputum, pain with passing urine, mouth sores, or a wound that will not heal.    What is this drug used for?   · It is used to keep the body from harming the organ after an organ transplant.  · It may be given to you for other reasons. Talk with the doctor.    What do I need to tell my doctor BEFORE I take this drug?   · If you are allergic to this drug; any part of this drug; or any other drugs, foods, or substances. Tell your doctor about the allergy and what signs you had.  · If you have Lesch-Nyhan or Cinda-Seegmiller syndrome or a rare inherited deficiency of hypoxanthine-guanine phosphoribosyl-transferase (HGPRT).  · If you are taking any of these drugs: Azathioprine, cholestyramine, colesevelam, colestipol, norfloxacin with metronidazole, or sevelamer.  · If you are taking another drug that has the same drug in it.  · If you are able to  get pregnant and are not using 2 kinds of birth control.  · If you are breast-feeding or plan to breast-feed.  This is not a list of all drugs or health problems that interact with this drug.  Tell your doctor and pharmacist about all of your drugs (prescription or OTC, natural products, vitamins) and health problems. You must check to make sure that it is safe for you to take this drug with all of your drugs and health problems. Do not start, stop, or change the dose of any drug without checking with your doctor.  What are some things I need to know or do while I take this drug?   · Tell all of your health care providers that you take this drug. This includes your doctors, nurses, pharmacists, and dentists.  · Avoid driving and doing other tasks or actions that call for you to be alert until you see how this drug affects you.  · If you have high blood sugar (diabetes), you will need to watch your blood sugar closely.  · Have blood work checked as you have been told by the doctor. Talk with the doctor.  · You may bleed more easily. Be careful and avoid injury. Use a soft toothbrush and an electric razor.  · The chance of skin cancer may be raised. Avoid lots of sun, sunlamps, and tanning beds. Use sunscreen and wear clothing and eyewear that protects you from the sun.  · A very bad brain problem called progressive multifocal leukoencephalopathy (PML) has happened with this drug. It may cause disability or can be deadly. Tell your doctor right away if you have signs like confusion, memory problems, low mood (depression), change in the way you act, change in strength on 1 side is greater than the other, trouble speaking or thinking, change in balance, or change in eyesight.  · Some people treated with this drug have had very bad kidney problems caused by a certain viral infection (BK virus). In people who have had a kidney transplant, BK virus infection may cause loss of the kidney. Call your doctor right away if you  have signs of kidney problems like change in the amount of urine passed, difficulty or pain when passing urine, or blood in the urine.  · Hepatitis B or C testing may be done. A hepatitis B or C infection may get worse while taking this drug.  · Bleeding, holes, and ulcers in the GI (gastrointestinal) tract have happened with this drug. Sometimes, people have had to go to the hospital. Talk with the doctor.  · This drug may cause diarrhea. If you get diarrhea, talk with your doctor about ways to lower this effect. Do not stop taking this drug without talking with your doctor.  · If you have phenylketonuria (PKU), talk with your doctor. Some products have phenylalanine.  · If you are 65 or older, use this drug with care. You could have more side effects.  · If your sex partner may get pregnant, you may need to protect your partner from pregnancy during treatment and for some time after your last dose. Talk with your doctor to see if you need to use birth control after your last dose.  · Birth control pills and other hormone-based birth control may not work as well to prevent pregnancy. Use 2 kinds of birth control while taking this drug.  · Use 2 kinds of birth control during treatment and for 6 weeks after your last dose.  · If you get pregnant while taking this drug or within 6 weeks after your last dose, call your doctor right away.    What are some side effects that I need to call my doctor about right away?   WARNING/CAUTION: Even though it may be rare, some people may have very bad and sometimes deadly side effects when taking a drug. Tell your doctor or get medical help right away if you have any of the following signs or symptoms that may be related to a very bad side effect:  · Signs of an allergic reaction, like rash; hives; itching; red, swollen, blistered, or peeling skin with or without fever; wheezing; tightness in the chest or throat; trouble breathing, swallowing, or talking; unusual hoarseness; or  swelling of the mouth, face, lips, tongue, or throat.  · Signs of high blood sugar like confusion, feeling sleepy, more thirst, more hungry, passing urine more often, flushing, fast breathing, or breath that smells like fruit.  · Signs of electrolyte problems like mood changes, confusion, muscle pain or weakness, a heartbeat that does not feel normal, seizures, not hungry, or very bad upset stomach or throwing up.  · Signs of high or low blood pressure like very bad headache or dizziness, passing out, or change in eyesight.  · Signs of kidney problems like unable to pass urine, change in how much urine is passed, blood in the urine, or a big weight gain.  · Signs of skin infection like oozing, heat, swelling, redness, or pain.  · Chest pain or pressure.  · Fast, slow, or abnormal heartbeat.  · Any unexplained bruising or bleeding.  · Throwing up blood or throw up that looks like coffee grounds.  · Black, tarry, or bloody stools.  · A burning, numbness, or tingling feeling that is not normal.  · Muscle cramps.  · Swelling.  · Pale skin.  · White patches in mouth.  · Vaginal itching or discharge.  · Yellow skin or eyes.  · Some people have had lung problems with this drug. Sometimes, this has been deadly. Call your doctor right away if you have signs of lung problems like shortness of breath or other trouble breathing, cough that is new or worse, or fever.  What are some other side effects of this drug?   All drugs may cause side effects. However, many people have no side effects or only have minor side effects. Call your doctor or get medical help if any of these side effects or any other side effects bother you or do not go away:  All products:   · Back pain.  · Constipation, diarrhea, stomach pain, upset stomach, throwing up, or feeling less hungry.  · Headache.  · Gas.  · Feeling dizzy, tired, or weak.  · Shakiness.  · Trouble sleeping.  · Joint pain.  Injection:   · Irritation where the shot is given.  These are  not all of the side effects that may occur. If you have questions about side effects, call your doctor. Call your doctor for medical advice about side effects.  You may report side effects to your national health agency.  You may report side effects to the FDA at 1-646.730.1349. You may also report side effects at https://www.fda.gov/medwatch.  How is this drug best taken?   Use this drug as ordered by your doctor. Read all information given to you. Follow all instructions closely.  All oral products:   · Take on an empty stomach at least 1 hour before or 2 hours after meals unless your doctor has told you otherwise.  · Keep taking this drug as you have been told by your doctor or other health care provider, even if you feel well.  · Take this drug at the same time of day.  · Take antacids that have aluminum or magnesium in them at least 2 hours after taking this drug.  All tablet products:   · Swallow whole. Do not chew, break, or crush.  · If you have trouble swallowing, talk with your doctor.  Capsules:   · Swallow whole. Do not chew, open, or crush.  · If you have trouble swallowing, talk with your doctor.  · If the capsule is opened or broken, do not touch the contents. If the contents are touched or they get in the eyes, wash hands or eyes right away.  Liquid (suspension):   · Shake well before use.  · Measure liquid doses carefully. Use the measuring device that comes with this drug.  · Do not mix with any other liquid drugs.  · If you get this drug on the skin, wash it off right away with soap and water.  · If this drug gets in the eyes, rinse with cool water.  · Those who have feeding tubes may use this drug. Use as you have been told. Flush the feeding tube after this drug is given.  Injection:   · It is given as an infusion into a vein over a period of time.  All products:   · Talk with your doctor before getting any vaccines. Use of some vaccines with this drug may either raise the chance of an infection  or make the vaccine not work as well.  · You may need to avoid donating blood while taking this drug and for some time after. Talk with your doctor.  · You may need to avoid donating sperm while taking this drug and for some time after. If you have questions, talk with your doctor.  What do I do if I miss a dose?   Delayed-release tablets:   · Take a missed dose as soon as you think about it.  · If it is close to the time for your next dose, skip the missed dose and go back to your normal time.  · Do not take 2 doses at the same time or extra doses.  All other oral products:   · Take a missed dose as soon as you think about it.  · If it is less than 2 hours until your next dose, skip the missed dose and go back to your normal time.  · Do not take 2 doses at the same time or extra doses.  Injection:   · Call your doctor to find out what to do.  How do I store and/or throw out this drug?   Liquid (suspension):   · Store at room temperature or in a refrigerator. Do not freeze.  · Throw away any unused part 60 days after this drug was mixed. Talk with your pharmacist if you are not sure when this is.  All other oral products:   · Store at room temperature protected from light. Store in a dry place. Do not store in a bathroom.  Injection:   · If you need to store this drug at home, talk with your doctor, nurse, or pharmacist about how to store it.  All products:   · Keep all drugs in a safe place. Keep all drugs out of the reach of children and pets.  · Throw away unused or  drugs. Do not flush down a toilet or pour down a drain unless you are told to do so. Check with your pharmacist if you have questions about the best way to throw out drugs. There may be drug take-back programs in your area.  General drug facts   · If your symptoms or health problems do not get better or if they become worse, call your doctor.  · Do not share your drugs with others and do not take anyone else's drugs.  · Some drugs may have  another patient information leaflet. If you have any questions about this drug, please talk with your doctor, nurse, pharmacist, or other health care provider.  · This drug comes with an extra patient fact sheet called a Medication Guide. Read it with care. Read it again each time this drug is refilled. If you have any questions about this drug, please talk with the doctor, pharmacist, or other health care provider.  · If you think there has been an overdose, call your poison control center or get medical care right away. Be ready to tell or show what was taken, how much, and when it happened.    Consumer Information Use and Disclaimer   This generalized information is a limited summary of diagnosis, treatment, and/or medication information. It is not meant to be comprehensive and should be used as a tool to help the user understand and/or assess potential diagnostic and treatment options. It does NOT include all information about conditions, treatments, medications, side effects, or risks that may apply to a specific patient. It is not intended to be medical advice or a substitute for the medical advice, diagnosis, or treatment of a health care provider based on the health care provider's examination and assessment of a patient's specific and unique circumstances. Patients must speak with a health care provider for complete information about their health, medical questions, and treatment options, including any risks or benefits regarding use of medications. This information does not endorse any treatments or medications as safe, effective, or approved for treating a specific patient. [employer], Inc[employer] and its affiliates disclaim any warranty or liability relating to this information or the use thereof. The use of this information is governed by the Terms of Use, available at https://www.[employer].com/en/solutions/lexicomp/about/daniel.  Last Reviewed Date   2020-06-15  Copyright   © 2021 [employer] and its  "affiliates and/or licensors. All rights reserved.  Patient Education       Rheumatoid Arthritis   The Basics   Written by the doctors and editors at [facility]   What is rheumatoid arthritis? -- Rheumatoid arthritis is a disease that causes pain, swelling, and stiffness in the joints. It is one of many different types of arthritis. Doctors do not know what causes it. But they do know that it happens when the body's infection-fighting system, called the immune system, "attacks" the joints.  How can I tell whether I have rheumatoid arthritis or another type of arthritis? -- You cannot tell. Only a doctor or nurse can tell you that. But there are some clues to look for. For instance, rheumatoid arthritis usually starts by affecting the small joints in the fingers (picture 1), the balls of the feet, and the wrists. It usually affects both the left and the right side at the same time. (Other types of arthritis tend to first affect larger joints, like the knees or hips. And they might affect one side much more than the other.)  What happens as rheumatoid arthritis gets worse? -- Even though it might start in the fingers and toes, rheumatoid arthritis can affect any of the joints. Sometimes it damages the joints forever. Plus, rheumatoid arthritis can cause problems in other parts of the body, such as the heart, lungs, or eyes. Doctors and nurses have no way of knowing which people will get which symptoms or how bad the symptoms will get.  Get treated early for rheumatoid arthritis -- If your doctor or nurse tells you that you have rheumatoid arthritis, start treatment right away. Do not wait until your symptoms get worse. Getting treated early can help prevent a lot of the damage the disease can do to your body.  What are the treatments for rheumatoid arthritis? -- There are dozens of medicines for rheumatoid arthritis. The right one for you will depend on:  · How bad your symptoms are  · How many of your joints are " "affected  · How your disease has changed over time  · What side effects you feel with the medicines you try  · What your X-rays look like  · The results of certain blood tests  In general, the treatment options include:  · Medicines called "nonsteroidal antiinflammatory drugs," also known as NSAIDs  · Medicines called steroids  · Medicines called "disease modifying antirheumatic drugs," also known as "DMARDs"  People who have severe pain that does not get better with the medicines listed above sometimes get opioid pain medicines. But this is not usually necessary. Also, unlike the other medicines used for rheumatoid arthritis, opioids do not help with inflammation or joint damage.  Is there anything I can do on my own to feel better? -- Yes. It is very important that you stay active. You might want to avoid being active because you are in pain. But that can make things worse. It will make your muscles weak and your joints stiffer than they already are. A physical therapist can help you figure out which exercises will do the most good. An occupational therapist can help you figure out how to keep doing the everyday tasks you need to do ? even with arthritis.  Another thing you can do to on your own is to eat a healthy diet. People with rheumatoid arthritis are at risk for heart disease, so avoid fatty foods. Instead, eats lots of fruits and vegetables.  What if I want to get pregnant? -- If you want to get pregnant, talk to your doctor or nurse about it before you start trying. Some of the medicines used to treat rheumatoid arthritis are not safe for a baby, so you might need to switch medicines before you get pregnant. Plus, there are things you should do to help prevent problems during the pregnancy. The symptoms of rheumatoid arthritis often get a lot better during pregnancy. But they can get worse again after the baby is born.  All topics are updated as new evidence becomes available and our peer review process " is complete.  This topic retrieved from [employer] on: Sep 21, 2021.  Topic 01350 Version 13.0  Release: 29.4.2 - C29.263  © 2021 UpToDate, Inc. and/or its affiliates. All rights reserved.  picture 1: Rheumatoid arthritis in the hands     These photos show the hands of a 38-year-old woman who was diagnosed with rheumatoid arthritis as a child.  Graphic 00874 Version 6.0    Consumer Information Use and Disclaimer   This information is not specific medical advice and does not replace information you receive from your health care provider. This is only a brief summary of general information. It does NOT include all information about conditions, illnesses, injuries, tests, procedures, treatments, therapies, discharge instructions or life-style choices that may apply to you. You must talk with your health care provider for complete information about your health and treatment options. This information should not be used to decide whether or not to accept your health care provider's advice, instructions or recommendations. Only your health care provider has the knowledge and training to provide advice that is right for you. The use of this information is governed by the [employer] End User License Agreement, available at https://www.PubGame/en/solutions/LendUp/about/daniel.The use of [employer] content is governed by the [employer] Terms of Use. ©2021 UpToDate, Inc. All rights reserved.  Copyright   © 2021 UpToDate, Inc. and/or its affiliates. All rights reserved.  Patient Education       Rheumatoid Arthritis Discharge Instructions   About this topic   Rheumatoid arthritis is also called RA. It is a long-term illness that causes problems with swelling of the lining of your joints. This leads to pain, stiffness, and problems moving. RA is caused by your own body attacking the joints. This is known as an autoimmune disorder. Doctors don't know what triggers each attack. RA can cause bone and joint damage after some  time.  Treatment includes drugs and therapy. Surgery may be needed if the bones and joints have been badly damaged.  What care is needed at home?   · Ask your doctor what you need to do when you go home. Make sure you ask questions if you do not understand what the doctor says. This way you will know what you need to do.  · Your doctor may tell you to use crutches, a walker, or a cane if your legs or knees are affected.  · If the pain is severe, your doctor may inject a drug into the affected joint or joints to treat the swelling. Keep the injection site clean and dry for 24 hours. You may feel the effect of the drug after 1 to 7 days.  · Your doctor may tell you to use ice or heat to help with pain.  ? Place an ice pack or a bag of frozen peas wrapped in a towel over the painful part. Never put ice right on the skin. Do not leave the ice on more than 10 to 15 minutes at a time.  ? Put a heating pad on the painful part for no more than 20 minutes at a time. Never go to sleep with a heating pad on as this can cause burns.  ? Paraffin baths are a hot wax treatment that you may be able to use on your hands or feet at home. You may buy units for this or you can make your own. You must be very careful not to get the wax too hot as this can cause burns. Follow instructions very carefully.  · You may wrap the swollen joint to help with swelling. You may use an elastic bandage. You may need a special compression glove if your fingers are involved.  · Rest the painful joint. Talk with your doctor about activities that may help your pain.  · Prop the affected part on 2 to 3 pillows when you rest. This may help lessen the swelling.  · If you are overweight, try to lose weight. This can put less stress on your joints.  What follow-up care is needed?   · Your doctor may ask you to make visits to the office to check on your progress. Be sure to keep these visits.  · You may also need to see:  ? A physical therapist (PT). The PT  will suggest an exercise program to keep joints flexible and strong. The PT may also do treatments to lessen pain and swelling.  ? An occupational therapist (OT). The OT will help you learn new ways to take care of yourself in order to make your daily activities easier. The OT may also make you a special splint if your hand is affected.  ? A mental health therapist. They will help you adjust to the changes in your life while dealing with your health problem. They may also help you with low mood.  ? A dietitian. This person will help you with special dietary needs or restrictions specific to your health problem.  What drugs may be needed?   The doctor may order drugs to:  · Help with pain and swelling  · Target parts of the immune system and slow the progression of the condition. These are called DMARDs or disease-modifying anti-rheumatic drugs.  Will physical activity be limited?   · When your joints are swollen and sore, you may want to do only limited activities. You need to rest and avoid overusing the affected area.  · When you are feeling better, it is important to stay active. Talk to your doctor about the best kind of activities for you.  · You may have problems holding or grasping things if your hands or fingers are affected.  · Walking or running may be hard if your legs, knees, hip, or feet are affected. Swimming is a good type of exercise that is easy on your joints.  What problems could happen?   · Signs of RA can return  · Damage to the bones and joints  · Joints may look deformed over time  · Weaker bones  · Heart problems  · Lung problems  · Muscle weakness  · Muscle pain  · Low blood counts  · Vision and eye problems  What can be done to prevent this health problem?   There are still no known ways to prevent this health problem. Early treatment may help lower the risk for more joint problems.  When do I need to call the doctor?   · Fever of 100.4°F (38°C) or higher  · Swelling gets worse  · Pain is  very bad and is not relieved by the drugs given to you  · Breathing problems  · Heartbeat feels too fast  Teach Back: Helping You Understand   The Teach Back Method helps you understand the information we are giving you. After you talk with the staff, tell them in your own words what you learned. This helps to make sure the staff has described each thing clearly. It also helps to explain things that may have been confusing. Before going home, make sure you can do these:  · I can tell you about my condition.  · I can tell you what may help ease my pain.  · I can tell you what I will do if I have more swelling or my pain is very bad.  Where can I learn more?   American College of Rheumatology  http://www.rheumatology.org/I-Am-A/Patient-Caregiver/Diseases-Conditions/Rheumatoid-Arthritis   National [place] of Health  https://www.niams.nih.gov/health-topics/rheumatoid-arthritis   NHS Choices  http://www.nhs.uk/conditions/Rheumatoid-arthritis/Pages/Introduction.aspx   Last Reviewed Date   2019-11-21  Consumer Information Use and Disclaimer   This information is not specific medical advice and does not replace information you receive from your health care provider. This is only a brief summary of general information. It does NOT include all information about conditions, illnesses, injuries, tests, procedures, treatments, therapies, discharge instructions or life-style choices that may apply to you. You must talk with your health care provider for complete information about your health and treatment options. This information should not be used to decide whether or not to accept your health care providers advice, instructions or recommendations. Only your health care provider has the knowledge and training to provide advice that is right for you.  Copyright   Copyright © 2021 UpToDate, Inc. and its affiliates and/or licensors. All rights reserved.

## 2022-01-06 NOTE — PROGRESS NOTES
RHEUMATOLOGY OUTPATIENT CLINIC NOTE    1/6/2022    Attending Rheumatologist: Michael Banks  Primary Care Provider/Physician Requesting Consultation: Lopez Smalls MD   Chief Complaint/Reason For Consultation:  Interstitial Lung Disease and positive rheumatoid factor      Subjective:     Jace Dean is a 76 y.o. White female with active interstitial lung disease and positive rheumatoid factor for rheumatic evaluation.  Patient reports diagnosis of rheumatoid arthritis by primary care physician on her mid 30s.  Was managed with NSAID therapy p.r.n.  Disease went into remission with the use of NSAIDs of steroids.  Patient developed progressive dyspnea on exertion, recent pulmonary workup revealed interstitial lung disease with predominant UIP pattern on imaging and elevated rheumatoid factor.  Other than chronic dyspnea on exertion and dry cough, patient voices no acute complaints.  Denies hand arthralgias or prolonged morning stiffness.  Does not have joint pain that interfere with activities of daily living.  Currently without fever, suspicious rashes, joint swelling, acute /GI complaints.    Review of Systems   Constitutional: Negative for fever, malaise/fatigue and weight loss.   HENT:        Denies significant xerostomia xerophthalmia   Eyes: Negative for photophobia and pain.   Respiratory: Positive for cough and shortness of breath. Negative for hemoptysis and sputum production.    Cardiovascular: Negative for leg swelling.   Gastrointestinal: Negative for blood in stool.   Genitourinary: Negative for hematuria.   Musculoskeletal: Negative for joint pain and neck pain.   Skin: Negative for rash.   Neurological: Negative for focal weakness and weakness.       Chronic comorbid conditions affecting medical decision making today:  Past Medical History:   Diagnosis Date    Hypertension      History reviewed. No pertinent surgical history.  Family History   Problem Relation Age of Onset    Cataracts  Father     Cataracts Mother      Social History     Substance and Sexual Activity   Alcohol Use No    Comment: OCC     Social History     Tobacco Use   Smoking Status Never Smoker   Smokeless Tobacco Never Used     Social History     Substance and Sexual Activity   Drug Use Not on file       Current Outpatient Medications:     albuterol sulfate (PROAIR RESPICLICK) 90 mcg/actuation inhaler, Inhale into the lungs 4 (four) times daily as needed., Disp: , Rfl:     simvastatin (ZOCOR) 40 MG tablet, Take 40 mg by mouth every evening., Disp: , Rfl:     albuterol (PROVENTIL) 2.5 mg /3 mL (0.083 %) nebulizer solution, Take 2.5 mg by nebulization every 6 (six) hours as needed., Disp: , Rfl:     losartan (COZAAR) 50 MG tablet, Take 50 mg by mouth once daily., Disp: , Rfl:      Objective:     Vitals:    01/06/22 0930   BP: 137/89   Pulse: 88     Physical Exam   Constitutional: She does not appear ill.   Eyes: Conjunctivae are normal.   Pulmonary/Chest: No respiratory distress.   Musculoskeletal:         General: No swelling or tenderness. Normal range of motion.      Comments: No obvious synovitis on exam or significant squeeze tenderness       Reviewed available old and all outside pertinent medical records available.    All lab results personally reviewed and interpreted by me.  Lab Results   Component Value Date    WBC 6.44 10/27/2021    HGB 11.7 (L) 10/27/2021    HCT 37.1 10/27/2021    MCV 92 10/27/2021    RDW 13.9 10/27/2021     10/27/2021       Lab Results   Component Value Date     09/15/2008    K 4.5 09/15/2008     09/15/2008    CO2 23 09/15/2008    GLU 90 09/15/2008    BUN 22 09/15/2008    CALCIUM 9.4 09/15/2008    PROT 7.1 09/15/2008    ALBUMIN 4.4 09/15/2008    BILITOT 0.4 09/15/2008    AST 15 09/15/2008    ALKPHOS 65 09/15/2008    ALT 14 12/17/2021       No results found for: COLORU, APPEARANCEUA, SPECGRAV, PHUR, PROTEINUA, GLUCOSEU, KETONESU, BLOODU, LEUKOCYTESUR, NITRITE, UROBILINOGEN    No  results found for: PTH    No results found for: URICACID    Lab Results   Component Value Date    CRP 46.2 (H) 12/14/2021       Lab Results   Component Value Date    ANATITER 1:640 12/14/2021       No components found for: TSPOTTB,  QUANTIFERON     ASSESSMENT:     Remote diagnosis of seropositive rheumatoid arthritis several decades ago, managed by primary care physician.  Currently without obvious synovitis on exam.  Extra-articular manifestation manifesting as interstitial lung disease with UIP pattern likely.  Scattered low-grade ground-glass opacities suggestive of minimal inflammation and predominant fibrosis.  Restrictive pattern PFT.  Would benefit from anti fibrotic therapy with CellCept.  Consider addition of Ofev by Pulmonary if deemed appropriate either as combination therapy or for refractory disease.  Will prescribe immunomodulator after lab review.  Recommend repeating labs 2 months after initiating new medication, and repeating CT scan and PFT from months after therapy.  Clinical side effects of therapy discussed in detail.    PLAN:     1. Rheumatoid factor positive  - Quantiferon Gold TB; Future  - Hepatitis Panel, Acute; Future  - X-Ray Hand Complete Bilateral; Future  - X-Ray Foot Complete Bilateral; Future  - Thiopurine Methyltrans (TPMT) Genotyping; Future  - CBC Auto Differential; Standing  - Comprehensive Metabolic Panel; Standing  - C-Reactive Protein; Standing  - Sedimentation rate; Standing    2. Counseling on health promotion and disease prevention    3. UIP (usual interstitial pneumonitis)      Follow up in about 3 months (around 4/6/2022).      Disclaimer: This note is prepared using voice recognition software and as such is likely to have errors and has not been proof read. Please contact me for questions.     Michael Banks M.D.

## 2022-02-23 ENCOUNTER — OFFICE VISIT (OUTPATIENT)
Dept: ALLERGY | Facility: CLINIC | Age: 77
End: 2022-02-23
Payer: MEDICARE

## 2022-02-23 ENCOUNTER — LAB VISIT (OUTPATIENT)
Dept: LAB | Facility: HOSPITAL | Age: 77
End: 2022-02-23
Attending: FAMILY MEDICINE
Payer: MEDICARE

## 2022-02-23 VITALS
HEART RATE: 87 BPM | TEMPERATURE: 98 F | DIASTOLIC BLOOD PRESSURE: 85 MMHG | HEIGHT: 63 IN | BODY MASS INDEX: 26.45 KG/M2 | WEIGHT: 149.25 LBS | SYSTOLIC BLOOD PRESSURE: 130 MMHG

## 2022-02-23 DIAGNOSIS — J84.9 INTERSTITIAL LUNG DISEASE: ICD-10-CM

## 2022-02-23 DIAGNOSIS — R76.8 ELEVATED IGE LEVEL: ICD-10-CM

## 2022-02-23 DIAGNOSIS — R76.8 ELEVATED IGE LEVEL: Primary | ICD-10-CM

## 2022-02-23 LAB
BASOPHILS # BLD AUTO: 0.05 K/UL (ref 0–0.2)
BASOPHILS NFR BLD: 0.7 % (ref 0–1.9)
DIFFERENTIAL METHOD: ABNORMAL
EOSINOPHIL # BLD AUTO: 0.6 K/UL (ref 0–0.5)
EOSINOPHIL NFR BLD: 8.4 % (ref 0–8)
ERYTHROCYTE [DISTWIDTH] IN BLOOD BY AUTOMATED COUNT: 13.1 % (ref 11.5–14.5)
HCT VFR BLD AUTO: 35.1 % (ref 37–48.5)
HGB BLD-MCNC: 10.8 G/DL (ref 12–16)
IMM GRANULOCYTES # BLD AUTO: 0.03 K/UL (ref 0–0.04)
IMM GRANULOCYTES NFR BLD AUTO: 0.4 % (ref 0–0.5)
LYMPHOCYTES # BLD AUTO: 1.3 K/UL (ref 1–4.8)
LYMPHOCYTES NFR BLD: 17.6 % (ref 18–48)
MCH RBC QN AUTO: 29.3 PG (ref 27–31)
MCHC RBC AUTO-ENTMCNC: 30.8 G/DL (ref 32–36)
MCV RBC AUTO: 95 FL (ref 82–98)
MONOCYTES # BLD AUTO: 0.8 K/UL (ref 0.3–1)
MONOCYTES NFR BLD: 10.9 % (ref 4–15)
NEUTROPHILS # BLD AUTO: 4.4 K/UL (ref 1.8–7.7)
NEUTROPHILS NFR BLD: 62 % (ref 38–73)
NRBC BLD-RTO: 0 /100 WBC
PLATELET # BLD AUTO: 298 K/UL (ref 150–450)
PMV BLD AUTO: 9.9 FL (ref 9.2–12.9)
RBC # BLD AUTO: 3.69 M/UL (ref 4–5.4)
WBC # BLD AUTO: 7.16 K/UL (ref 3.9–12.7)

## 2022-02-23 PROCEDURE — 99999 PR PBB SHADOW E&M-EST. PATIENT-LVL III: CPT | Mod: PBBFAC,,, | Performed by: ALLERGY & IMMUNOLOGY

## 2022-02-23 PROCEDURE — 99213 OFFICE O/P EST LOW 20 MIN: CPT | Mod: PBBFAC | Performed by: ALLERGY & IMMUNOLOGY

## 2022-02-23 PROCEDURE — 99204 OFFICE O/P NEW MOD 45 MIN: CPT | Mod: S$PBB,25,, | Performed by: ALLERGY & IMMUNOLOGY

## 2022-02-23 PROCEDURE — 86682 HELMINTH ANTIBODY: CPT | Performed by: ALLERGY & IMMUNOLOGY

## 2022-02-23 PROCEDURE — 36415 COLL VENOUS BLD VENIPUNCTURE: CPT | Performed by: ALLERGY & IMMUNOLOGY

## 2022-02-23 PROCEDURE — 99999 PR PBB SHADOW E&M-EST. PATIENT-LVL III: ICD-10-PCS | Mod: PBBFAC,,, | Performed by: ALLERGY & IMMUNOLOGY

## 2022-02-23 PROCEDURE — 95004 PR ALLERGY SKIN TESTS,ALLERGENS: ICD-10-PCS | Mod: S$PBB,,, | Performed by: ALLERGY & IMMUNOLOGY

## 2022-02-23 PROCEDURE — 95004 PERQ TESTS W/ALRGNC XTRCS: CPT | Mod: PBBFAC | Performed by: ALLERGY & IMMUNOLOGY

## 2022-02-23 PROCEDURE — 99204 PR OFFICE/OUTPT VISIT, NEW, LEVL IV, 45-59 MIN: ICD-10-PCS | Mod: S$PBB,25,, | Performed by: ALLERGY & IMMUNOLOGY

## 2022-02-23 PROCEDURE — 85025 COMPLETE CBC W/AUTO DIFF WBC: CPT | Performed by: ALLERGY & IMMUNOLOGY

## 2022-02-23 PROCEDURE — 95004 PERQ TESTS W/ALRGNC XTRCS: CPT | Mod: S$PBB,,, | Performed by: ALLERGY & IMMUNOLOGY

## 2022-02-23 NOTE — PROGRESS NOTES
Subjective:       Patient ID: Jace Dean is a 76 y.o. female.      Referred by Dr. Emmanuel Martinez due to an elevated IgE(459)    Chief Complaint:  Allergies      HPI: 76 year old female with a history of Interstitial Lung Disease referred with a history of an elevated IgE.   She reports runny nose, nasal congestion, itchy and watery eyes, as well as post nasal drip. She does not take medications for these symptoms. Symptoms are not severe or significant to warrant treatment.         Past Medical History:   Diagnosis Date    Hypertension      Family History   Problem Relation Age of Onset    Cataracts Father     Cataracts Mother      Current Outpatient Medications on File Prior to Visit   Medication Sig Dispense Refill    albuterol sulfate (PROAIR RESPICLICK) 90 mcg/actuation inhaler Inhale into the lungs 4 (four) times daily as needed.      simvastatin (ZOCOR) 40 MG tablet Take 40 mg by mouth every evening.      albuterol (PROVENTIL) 2.5 mg /3 mL (0.083 %) nebulizer solution Take 2.5 mg by nebulization every 6 (six) hours as needed.      losartan (COZAAR) 50 MG tablet Take 50 mg by mouth once daily.      [DISCONTINUED] mycophenolate (CELLCEPT) 500 mg Tab Take 1 tablet (500 mg total) by mouth once daily for 14 days, THEN 1 tablet (500 mg total) 2 (two) times daily for 14 days, THEN 2 tablets (1,000 mg total) 2 (two) times daily. (Patient not taking: Reported on 2/23/2022) 162 tablet 3     No current facility-administered medications on file prior to visit.     Review of patient's allergies indicates:   Allergen Reactions    Meperidine     Penicillins     Tetracyclines        Environmental History: Pets in the home: none. She does not smoke.  Review of Systems   Constitutional: Negative for chills and fever.   HENT: Positive for congestion, postnasal drip and rhinorrhea.    Eyes: Positive for discharge and itching.   Respiratory: Positive for cough, shortness of breath and wheezing.    Cardiovascular:  Negative for chest pain and leg swelling.   Gastrointestinal: Negative for nausea and vomiting.   Endocrine: Negative for cold intolerance and heat intolerance.   Skin: Negative for rash and wound.   Allergic/Immunologic: Positive for environmental allergies. Negative for food allergies.   Neurological: Negative for facial asymmetry and speech difficulty.   Hematological: Negative for adenopathy. Does not bruise/bleed easily.   Psychiatric/Behavioral: Negative for behavioral problems and suicidal ideas.        Objective:    Physical Exam  Vitals reviewed.   Constitutional:       General: She is not in acute distress.     Appearance: Normal appearance. She is well-developed. She is not ill-appearing, toxic-appearing or diaphoretic.   HENT:      Head: Normocephalic and atraumatic.      Right Ear: Tympanic membrane, ear canal and external ear normal. There is no impacted cerumen.      Left Ear: Tympanic membrane, ear canal and external ear normal. There is no impacted cerumen.      Nose: Nose normal. No congestion or rhinorrhea.      Mouth/Throat:      Mouth: Mucous membranes are moist.      Pharynx: No oropharyngeal exudate or posterior oropharyngeal erythema.   Eyes:      General: No scleral icterus.        Right eye: No discharge.         Left eye: No discharge.      Pupils: Pupils are equal, round, and reactive to light.   Neck:      Thyroid: No thyromegaly.   Cardiovascular:      Rate and Rhythm: Normal rate and regular rhythm.      Heart sounds: Normal heart sounds. No murmur heard.  Pulmonary:      Effort: Pulmonary effort is normal. No respiratory distress.      Breath sounds: Normal breath sounds. No stridor. No wheezing, rhonchi or rales.   Chest:      Chest wall: No tenderness.   Abdominal:      General: Bowel sounds are normal. There is no distension.      Palpations: Abdomen is soft. There is no mass.      Tenderness: There is no abdominal tenderness. There is no guarding.      Hernia: No hernia is  present.   Musculoskeletal:         General: Normal range of motion.      Cervical back: Normal range of motion and neck supple. No rigidity or tenderness.   Lymphadenopathy:      Cervical: No cervical adenopathy.   Skin:     General: Skin is warm.      Coloration: Skin is not pale.      Findings: No erythema.   Neurological:      Mental Status: She is alert and oriented to person, place, and time.      Motor: No weakness.      Gait: Gait normal.   Psychiatric:         Mood and Affect: Mood normal.         Behavior: Behavior normal.         Thought Content: Thought content normal.         Judgment: Judgment normal.             Allergy Skin Prick testing  Histamine 7X6, 20 X15  Saline 3X2  She had an appropriate response to positive and negative controls.  She had a negative response to the following:  Cat, Dog, Dust mite(F and P), Cockroach, Alternaria, Aspergillus, Helminthosporium, [place]  [place], Elm, [place], Ligustrum, Oak, Pecan, Red [place]edar, Blue [place], [place], [place], [place], Red [place]/Agrostis [place], Rye/Lolium, [place], English Plantain, Marsh Elder, Pigweed, Ragweed, Russian Thistle, Curvularia/Drechsiera Spicifera, Epicoccum, Fusarium, Hormodendrum/Cladosporium, Penicillium, Monilia/candida, Phoma, Stemphylium  Assessment:       1. Elevated IgE level    2. Interstitial lung disease         Plan:        Allergy Skin Prick testing to inhalants was negative.    Labs to check for a parasitic infection were ordered.  Suspect IgE is elevated due to other chronic inflammatory conditions(non allergic).    Elevated IgE level  -     Ambulatory referral/consult to Allergy  -     TOXACARA ANTIBODIES; Future; Expected date: 02/23/2022  -     STRONGYLOIDES IGG ANTIBODIES; Future; Expected date: 02/23/2022  -     CBC Auto Differential; Future; Expected date: 02/23/2022  -     Stool Exam-Ova,Cysts,Parasites; Future; Expected date: 02/23/2022  -     Stool Exam-Ova,Cysts,Parasites; Future; Expected date: 02/23/2022  -      Stool Exam-Ova,Cysts,Parasites; Future; Expected date: 02/23/2022    Interstitial lung disease      RTC 4-6 weeks or sooner, if needed, to discuss labwork.    ROCKY KEMP spent a total of 45 minutes on the day of the visit.  This includes face to face time and non-face to face time preparing to see the patient (eg, review of tests), obtaining and/or reviewing separately obtained history, documenting clinical information in the electronic or other health record, independently interpreting results and communicating results to the patient/family/caregiver, or care coordinator.    CC:Dr. Patterson

## 2022-02-25 LAB — STRONGYLOIDES ANTIBODY IGG: NEGATIVE

## 2022-03-01 LAB — T CANIS IGG SER QL IA: NEGATIVE

## 2022-03-08 NOTE — PROGRESS NOTES
Please advise that I can not send them to him via Epic, as he is not part of the [facility] system. She can sign a medical release at his office to obtain them.

## 2022-04-08 ENCOUNTER — OFFICE VISIT (OUTPATIENT)
Dept: PULMONOLOGY | Facility: CLINIC | Age: 77
End: 2022-04-08
Payer: MEDICARE

## 2022-04-08 VITALS
WEIGHT: 147.94 LBS | OXYGEN SATURATION: 96 % | HEIGHT: 63 IN | DIASTOLIC BLOOD PRESSURE: 78 MMHG | RESPIRATION RATE: 17 BRPM | HEART RATE: 92 BPM | BODY MASS INDEX: 26.21 KG/M2 | SYSTOLIC BLOOD PRESSURE: 112 MMHG

## 2022-04-08 DIAGNOSIS — J84.9 ILD (INTERSTITIAL LUNG DISEASE): ICD-10-CM

## 2022-04-08 DIAGNOSIS — R76.8 RHEUMATOID FACTOR POSITIVE: ICD-10-CM

## 2022-04-08 DIAGNOSIS — M05.10 DIFFUSE INTERSTITIAL RHEUMATOID DISEASE OF LUNG: ICD-10-CM

## 2022-04-08 DIAGNOSIS — I73.9 CLAUDICATION: Primary | ICD-10-CM

## 2022-04-08 PROCEDURE — 3288F FALL RISK ASSESSMENT DOCD: CPT | Mod: CPTII,S$GLB,, | Performed by: INTERNAL MEDICINE

## 2022-04-08 PROCEDURE — 99999 PR PBB SHADOW E&M-EST. PATIENT-LVL III: ICD-10-PCS | Mod: PBBFAC,,, | Performed by: INTERNAL MEDICINE

## 2022-04-08 PROCEDURE — 3078F DIAST BP <80 MM HG: CPT | Mod: CPTII,S$GLB,, | Performed by: INTERNAL MEDICINE

## 2022-04-08 PROCEDURE — 1159F PR MEDICATION LIST DOCUMENTED IN MEDICAL RECORD: ICD-10-PCS | Mod: CPTII,S$GLB,, | Performed by: INTERNAL MEDICINE

## 2022-04-08 PROCEDURE — 99999 PR PBB SHADOW E&M-EST. PATIENT-LVL III: CPT | Mod: PBBFAC,,, | Performed by: INTERNAL MEDICINE

## 2022-04-08 PROCEDURE — 3074F SYST BP LT 130 MM HG: CPT | Mod: CPTII,S$GLB,, | Performed by: INTERNAL MEDICINE

## 2022-04-08 PROCEDURE — 99215 PR OFFICE/OUTPT VISIT, EST, LEVL V, 40-54 MIN: ICD-10-PCS | Mod: S$GLB,,, | Performed by: INTERNAL MEDICINE

## 2022-04-08 PROCEDURE — 3288F PR FALLS RISK ASSESSMENT DOCUMENTED: ICD-10-PCS | Mod: CPTII,S$GLB,, | Performed by: INTERNAL MEDICINE

## 2022-04-08 PROCEDURE — 3078F PR MOST RECENT DIASTOLIC BLOOD PRESSURE < 80 MM HG: ICD-10-PCS | Mod: CPTII,S$GLB,, | Performed by: INTERNAL MEDICINE

## 2022-04-08 PROCEDURE — 1101F PR PT FALLS ASSESS DOC 0-1 FALLS W/OUT INJ PAST YR: ICD-10-PCS | Mod: CPTII,S$GLB,, | Performed by: INTERNAL MEDICINE

## 2022-04-08 PROCEDURE — 1101F PT FALLS ASSESS-DOCD LE1/YR: CPT | Mod: CPTII,S$GLB,, | Performed by: INTERNAL MEDICINE

## 2022-04-08 PROCEDURE — 1159F MED LIST DOCD IN RCRD: CPT | Mod: CPTII,S$GLB,, | Performed by: INTERNAL MEDICINE

## 2022-04-08 PROCEDURE — 3074F PR MOST RECENT SYSTOLIC BLOOD PRESSURE < 130 MM HG: ICD-10-PCS | Mod: CPTII,S$GLB,, | Performed by: INTERNAL MEDICINE

## 2022-04-08 PROCEDURE — 99215 OFFICE O/P EST HI 40 MIN: CPT | Mod: S$GLB,,, | Performed by: INTERNAL MEDICINE

## 2022-04-08 RX ORDER — PREDNISONE 10 MG/1
10 TABLET ORAL DAILY
Qty: 30 TABLET | Refills: 3 | Status: SHIPPED | OUTPATIENT
Start: 2022-04-08 | End: 2022-06-08

## 2022-04-08 NOTE — PROGRESS NOTES
Pulmonary Outpatient Follow Up Visit     Subjective:       Patient ID: Jace Dean is a 77 y.o. female.    Chief Complaint: Interstitial Lung Disease      HPI          76 y o f pt presenting for 3 months f / up.     Initially evaluated 10/2021 for worsening chronic dry cough and shortness of breath at rest and on exertion over the last year.         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       Denies previous history of asthma.       Mild relief of coughing and SOB with albuterol.       She has seen Dr. King  in the past.       Diagnosed with interstitial lung disease with autoimmune features likely related to rheumatoid arthritis elevated rheumatoid factor 150, CellCept suggested by Dr. Banks Rheumatology but the patient was concerned about side effects.      Complains of claudication of her lower extremity at rest and on exertion.      Review of Systems   Constitutional: Positive for fatigue. Negative for fever and chills.   HENT: Negative for nosebleeds.    Eyes: Negative for redness.   Respiratory: Positive for cough, shortness of breath, dyspnea on extertion and use of rescue inhaler. Negative for choking.    Cardiovascular: Positive for chest pain.        Bilateral lower extremity claudication   Genitourinary: Negative for hematuria.   Endocrine: Negative for cold intolerance.    Musculoskeletal: Positive for arthralgias.   Skin: Negative for rash.   Gastrointestinal: Negative for vomiting.   Neurological: Negative for syncope.   Hematological: Negative for adenopathy.   Psychiatric/Behavioral: Negative for confusion.       Outpatient Encounter Medications as of 4/8/2022   Medication Sig Dispense Refill    albuterol (PROVENTIL) 2.5 mg /3 mL (0.083 %) nebulizer solution Take 2.5 mg by nebulization every 6 (six) hours as needed.      albuterol sulfate (PROAIR RESPICLICK) 90 mcg/actuation  "inhaler Inhale into the lungs 4 (four) times daily as needed.      simvastatin (ZOCOR) 40 MG tablet Take 40 mg by mouth every evening.      losartan (COZAAR) 50 MG tablet Take 50 mg by mouth once daily.      predniSONE (DELTASONE) 10 MG tablet Take 1 tablet (10 mg total) by mouth once daily. 30 tablet 3     No facility-administered encounter medications on file as of 4/8/2022.       Objective:     Vital Signs (Most Recent)  Vital Signs  Pulse: 92  Resp: 17  SpO2: 96 %  BP: 112/78  Height and Weight  Height: 5' 3" (160 cm)  Weight: 67.1 kg (147 lb 14.9 oz)  BSA (Calculated - sq m): 1.73 sq meters  BMI (Calculated): 26.2  Weight in (lb) to have BMI = 25: 140.8]  Wt Readings from Last 2 Encounters:   04/08/22 67.1 kg (147 lb 14.9 oz)   02/23/22 67.7 kg (149 lb 4 oz)       Physical Exam   Constitutional: She is oriented to person, place, and time. She appears well-developed and well-nourished. No distress.   HENT:   Head: Normocephalic.   Cardiovascular: Normal rate and regular rhythm.   Pulmonary/Chest: Normal expansion and effort normal. No stridor. No respiratory distress. She has rales. She exhibits no tenderness.   Bilateral crackles +++ lower fields    Abdominal: She exhibits no distension.   Musculoskeletal:         General: No tenderness.      Cervical back: Neck supple.   Lymphadenopathy:     She has no cervical adenopathy.   Neurological: She is alert and oriented to person, place, and time. Gait normal.   Skin: Skin is warm. No cyanosis. Nails show no clubbing.   Psychiatric: She has a normal mood and affect. Her behavior is normal. Judgment and thought content normal.   Nursing note and vitals reviewed.      Laboratory  Lab Results   Component Value Date    WBC 7.16 02/23/2022    RBC 3.69 (L) 02/23/2022    HGB 10.8 (L) 02/23/2022    HCT 35.1 (L) 02/23/2022    MCV 95 02/23/2022    MCH 29.3 02/23/2022    MCHC 30.8 (L) 02/23/2022    RDW 13.1 02/23/2022     02/23/2022    MPV 9.9 02/23/2022    GRAN 4.4 " 02/23/2022    GRAN 62.0 02/23/2022    LYMPH 1.3 02/23/2022    LYMPH 17.6 (L) 02/23/2022    MONO 0.8 02/23/2022    MONO 10.9 02/23/2022    EOS 0.6 (H) 02/23/2022    BASO 0.05 02/23/2022    EOSINOPHIL 8.4 (H) 02/23/2022    BASOPHIL 0.7 02/23/2022       BMP  Lab Results   Component Value Date     01/06/2022    K 4.2 01/06/2022     01/06/2022    CO2 25 01/06/2022    BUN 26 (H) 01/06/2022    CREATININE 1.4 01/06/2022    CALCIUM 10.1 01/06/2022    ANIONGAP 9 01/06/2022    ESTGFRAFRICA 42 (A) 01/06/2022    EGFRNONAA 36 (A) 01/06/2022    AST 19 01/06/2022    ALT 11 01/06/2022    PROT 7.5 01/06/2022       Lab Results   Component Value Date    BNP 44 10/27/2021       No results found for: TSH    Lab Results   Component Value Date    SEDRATE 104 (H) 01/06/2022       Lab Results   Component Value Date    CRP 38.9 (H) 01/06/2022     Lab Results   Component Value Date     (H) 10/27/2021        No results found for: ASPERGILLUS  No results found for: AFUMIGATUSCL     Lab Results   Component Value Date    ACE 27 12/14/2021        Diagnostic Results:  I have personally reviewed today the following studies:    CT chest 12/2021    CT CHEST WITHOUT CONTRAST     CLINICAL HISTORY:  restrictive lung dz; Interstitial pulmonary disease, unspecified     TECHNIQUE:  Low dose axial images, sagittal and coronal reformations were obtained from the thoracic inlet to the lung bases. Contrast was not administered.     COMPARISON:  None     FINDINGS:  Base of Neck: No significant abnormality.     Thoracic soft tissues: Normal.     Aorta: Left-sided aortic arch.  Mild atherosclerosis.  No aneurysm.     Heart: Normal size.  Minimal fluid in the pericardial recesses.     Pulmonary vasculature: Pulmonary arteries distribute normally.  There are four pulmonary veins.     Daysi/Mediastinum: Multiple subcentimeter in short axis mediastinal lymph nodes.  No pathologic mercedez enlargement.     Airways: Patent.     Lungs/Pleura: Bilateral  peripheral and basilar distribution of subpleural reticulation, most pronounced in the lower lobes and lingula with associated honeycombing.  No suspicious pulmonary nodules/masses.  Scattered low-grade ground-glass opacities also noted.  No consolidation or pleural effusion.     Esophagus: Normal.     Upper Abdomen: No abnormality of the partially imaged upper abdomen.     Bones: No acute fracture. No suspicious lytic or sclerotic lesions.     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                CXR 10/27/2021    FINDINGS:  Mild hyperinflation with diffuse coarsening interstitial markings bilaterally.  This most pronounced in the mid and lower lung fields, greater on the left.  No consolidation, effusion or pneumothorax.  Bilateral apical pleural thickening.  Narrowing cardiomediastinal contour and tortuous aorta with underlying atherosclerotic calcification.  Trachea is midline.  There is osteopenia and spondylosis.  Minimal anterior wedging T12 level which is of uncertain chronicity.  Correlate for point tenderness.            Nuc stress 11/29/2021      Normal myocardial perfusion scan. There is no evidence of myocardial ischemia or infarction.    The gated perfusion images showed an ejection fraction of 88% at rest. The gated perfusion images showed an ejection fraction of 86% post stress.    The EKG portion of this study is negative for ischemia.    There were no arrhythmias during stress.       Echo 10/2021      Summary    · Concentric hypertrophy and normal systolic function.  · The ascending aorta is mildly dilated.  · Mild tricuspid regurgitation.  · The estimated ejection fraction is 60%.  · Normal left ventricular diastolic function.  · With normal right ventricular systolic function.  · Normal central venous pressure (3 mmHg).  · The estimated PA systolic pressure is 26 mmHg.      Six min 11/2021    Interpretation:   Total distance walked in six minutes  is   severelyreduced and therefore  the overall  functional capacity is   severely reduced. There was no significant exercise induced hypoxemia.       PFT 2021    Grade A-D -acceptable quality of tracingsModerate restriction based on reduced Forced Vital Capacity (FVC). Consider lung volumes if clinically indicated.Mild restriction. (TLC< LLN and > or equal to 70% of predicted).Diffusion capacity is severely   reduced - unadjusted for hemoglobin (DLCO < 40% predicted).Maximum voluntary ventilation is moderately reduced. (MVV% predicted is 51% to 65% of predicted)Flow volume loops demonstrate a restrictive defect.Overall there is no significant ventilatory   impairment. (FEV1 >LLN)Flow volume loops demonstrate a restrictive defect.Pattern of restriction and decreased diffusion suggest interstitial pulmonary fibrosis. Clinical correlation suggested.             Assessment/Plan:   Claudication  -     Radiology US Lower Extremity Arteries Bilateral; Future; Expected date: 04/08/2022    ILD (interstitial lung disease)  -     predniSONE (DELTASONE) 10 MG tablet; Take 1 tablet (10 mg total) by mouth once daily.  Dispense: 30 tablet; Refill: 3  -     Spirometry Without Bronchodilator & DLCO; Future; Expected date: 06/22/2022    Rheumatoid factor positive  -     predniSONE (DELTASONE) 10 MG tablet; Take 1 tablet (10 mg total) by mouth once daily.  Dispense: 30 tablet; Refill: 3  -     Sedimentation rate; Future; Expected date: 04/08/2022  -     C-Reactive Protein; Future; Expected date: 04/08/2022    Diffuse interstitial rheumatoid disease of lung  -     predniSONE (DELTASONE) 10 MG tablet; Take 1 tablet (10 mg total) by mouth once daily.  Dispense: 30 tablet; Refill: 3  -     Spirometry Without Bronchodilator & DLCO; Future; Expected date: 06/22/2022    Patient likely has ILD with autoimmune features due to rheumatoid arthritis.    Elevated ESR CRP ground-glass opacities on CT scan of the chest, not willing to start  CellCept, will do a trial of low-dose prednisone 10 mg daily.    Surveillance spirometry and serologies will be ordered.    Check lower extremity arterial ultrasound.    Further recommendation to follow above workup and patient clinical response to steroid.      Follow up in about 10 weeks (around 6/17/2022).    This note was prepared using voice recognition system and is likely to have sound alike errors that may have been overlooked even after proof reading.  Please call me with any questions    Discussed diagnosis, its evaluation, treatment and usual course. All questions answered.      Emmanuel Patterson MD

## 2022-04-13 ENCOUNTER — HOSPITAL ENCOUNTER (OUTPATIENT)
Dept: RADIOLOGY | Facility: HOSPITAL | Age: 77
Discharge: HOME | End: 2022-04-13
Attending: INTERNAL MEDICINE
Payer: MEDICARE

## 2022-04-13 DIAGNOSIS — I73.9 CLAUDICATION: ICD-10-CM

## 2022-04-13 PROCEDURE — 93925 LOWER EXTREMITY STUDY: CPT | Mod: TC

## 2022-04-13 PROCEDURE — 93925 LOWER EXTREMITY STUDY: CPT | Mod: 26,,, | Performed by: RADIOLOGY

## 2022-04-13 PROCEDURE — 93925 US LOWER EXTREMITY ARTERIES BILATERAL: ICD-10-PCS | Mod: 26,,, | Performed by: RADIOLOGY

## 2022-04-21 ENCOUNTER — TELEPHONE (OUTPATIENT)
Dept: PULMONOLOGY | Facility: CLINIC | Age: 77
End: 2022-04-21
Payer: MEDICARE

## 2022-04-21 NOTE — TELEPHONE ENCOUNTER
----- Message from Emmanuel Patterson MD sent at 4/16/2022  5:23 PM CDT -----  Please inform patient ultrasound of her legs was completely normal

## 2022-06-08 ENCOUNTER — CLINICAL SUPPORT (OUTPATIENT)
Dept: PULMONOLOGY | Facility: CLINIC | Age: 77
End: 2022-06-08
Payer: MEDICARE

## 2022-06-08 ENCOUNTER — LAB VISIT (OUTPATIENT)
Dept: LAB | Facility: HOSPITAL | Age: 77
End: 2022-06-08
Attending: INTERNAL MEDICINE
Payer: MEDICARE

## 2022-06-08 ENCOUNTER — OFFICE VISIT (OUTPATIENT)
Dept: PULMONOLOGY | Facility: CLINIC | Age: 77
End: 2022-06-08
Payer: MEDICARE

## 2022-06-08 VITALS
WEIGHT: 167.75 LBS | HEIGHT: 63 IN | RESPIRATION RATE: 16 BRPM | SYSTOLIC BLOOD PRESSURE: 124 MMHG | BODY MASS INDEX: 29.72 KG/M2 | OXYGEN SATURATION: 97 % | DIASTOLIC BLOOD PRESSURE: 78 MMHG | HEART RATE: 73 BPM

## 2022-06-08 DIAGNOSIS — J98.4 RESTRICTIVE LUNG DISEASE: ICD-10-CM

## 2022-06-08 DIAGNOSIS — M05.10 DIFFUSE INTERSTITIAL RHEUMATOID DISEASE OF LUNG: ICD-10-CM

## 2022-06-08 DIAGNOSIS — R76.8 RHEUMATOID FACTOR POSITIVE: ICD-10-CM

## 2022-06-08 DIAGNOSIS — J84.9 ILD (INTERSTITIAL LUNG DISEASE): Primary | ICD-10-CM

## 2022-06-08 DIAGNOSIS — J84.9 ILD (INTERSTITIAL LUNG DISEASE): ICD-10-CM

## 2022-06-08 DIAGNOSIS — Z79.52 CURRENT CHRONIC USE OF SYSTEMIC STEROIDS: ICD-10-CM

## 2022-06-08 DIAGNOSIS — Z71.89 MEDICATION CARE PLAN DISCUSSED WITH PATIENT: ICD-10-CM

## 2022-06-08 LAB
CRP SERPL-MCNC: 3.4 MG/L (ref 0–8.2)
ERYTHROCYTE [SEDIMENTATION RATE] IN BLOOD BY WESTERGREN METHOD: 13 MM/HR (ref 0–36)

## 2022-06-08 PROCEDURE — 1101F PR PT FALLS ASSESS DOC 0-1 FALLS W/OUT INJ PAST YR: ICD-10-PCS | Mod: CPTII,S$GLB,, | Performed by: INTERNAL MEDICINE

## 2022-06-08 PROCEDURE — 94010 BREATHING CAPACITY TEST: ICD-10-PCS | Mod: S$GLB,,, | Performed by: INTERNAL MEDICINE

## 2022-06-08 PROCEDURE — 1101F PT FALLS ASSESS-DOCD LE1/YR: CPT | Mod: CPTII,S$GLB,, | Performed by: INTERNAL MEDICINE

## 2022-06-08 PROCEDURE — 99999 PR PBB SHADOW E&M-EST. PATIENT-LVL III: ICD-10-PCS | Mod: PBBFAC,,, | Performed by: INTERNAL MEDICINE

## 2022-06-08 PROCEDURE — 85652 RBC SED RATE AUTOMATED: CPT | Performed by: INTERNAL MEDICINE

## 2022-06-08 PROCEDURE — 94729 PR C02/MEMBANE DIFFUSE CAPACITY: ICD-10-PCS | Mod: S$GLB,,, | Performed by: INTERNAL MEDICINE

## 2022-06-08 PROCEDURE — 99999 PR PBB SHADOW E&M-EST. PATIENT-LVL III: CPT | Mod: PBBFAC,,, | Performed by: INTERNAL MEDICINE

## 2022-06-08 PROCEDURE — 99215 OFFICE O/P EST HI 40 MIN: CPT | Mod: 25,S$GLB,, | Performed by: INTERNAL MEDICINE

## 2022-06-08 PROCEDURE — 3078F PR MOST RECENT DIASTOLIC BLOOD PRESSURE < 80 MM HG: ICD-10-PCS | Mod: CPTII,S$GLB,, | Performed by: INTERNAL MEDICINE

## 2022-06-08 PROCEDURE — 3288F FALL RISK ASSESSMENT DOCD: CPT | Mod: CPTII,S$GLB,, | Performed by: INTERNAL MEDICINE

## 2022-06-08 PROCEDURE — 1160F PR REVIEW ALL MEDS BY PRESCRIBER/CLIN PHARMACIST DOCUMENTED: ICD-10-PCS | Mod: CPTII,S$GLB,, | Performed by: INTERNAL MEDICINE

## 2022-06-08 PROCEDURE — 99215 PR OFFICE/OUTPT VISIT, EST, LEVL V, 40-54 MIN: ICD-10-PCS | Mod: 25,S$GLB,, | Performed by: INTERNAL MEDICINE

## 2022-06-08 PROCEDURE — 3074F PR MOST RECENT SYSTOLIC BLOOD PRESSURE < 130 MM HG: ICD-10-PCS | Mod: CPTII,S$GLB,, | Performed by: INTERNAL MEDICINE

## 2022-06-08 PROCEDURE — 3288F PR FALLS RISK ASSESSMENT DOCUMENTED: ICD-10-PCS | Mod: CPTII,S$GLB,, | Performed by: INTERNAL MEDICINE

## 2022-06-08 PROCEDURE — 94010 BREATHING CAPACITY TEST: CPT | Mod: S$GLB,,, | Performed by: INTERNAL MEDICINE

## 2022-06-08 PROCEDURE — 3074F SYST BP LT 130 MM HG: CPT | Mod: CPTII,S$GLB,, | Performed by: INTERNAL MEDICINE

## 2022-06-08 PROCEDURE — 86140 C-REACTIVE PROTEIN: CPT | Performed by: INTERNAL MEDICINE

## 2022-06-08 PROCEDURE — 1159F PR MEDICATION LIST DOCUMENTED IN MEDICAL RECORD: ICD-10-PCS | Mod: CPTII,S$GLB,, | Performed by: INTERNAL MEDICINE

## 2022-06-08 PROCEDURE — 94729 DIFFUSING CAPACITY: CPT | Mod: S$GLB,,, | Performed by: INTERNAL MEDICINE

## 2022-06-08 PROCEDURE — 3078F DIAST BP <80 MM HG: CPT | Mod: CPTII,S$GLB,, | Performed by: INTERNAL MEDICINE

## 2022-06-08 PROCEDURE — 1160F RVW MEDS BY RX/DR IN RCRD: CPT | Mod: CPTII,S$GLB,, | Performed by: INTERNAL MEDICINE

## 2022-06-08 PROCEDURE — 36415 COLL VENOUS BLD VENIPUNCTURE: CPT | Performed by: INTERNAL MEDICINE

## 2022-06-08 PROCEDURE — 1159F MED LIST DOCD IN RCRD: CPT | Mod: CPTII,S$GLB,, | Performed by: INTERNAL MEDICINE

## 2022-06-08 RX ORDER — PREDNISONE 5 MG/1
5 TABLET ORAL DAILY
Qty: 90 TABLET | Refills: 2 | Status: SHIPPED | OUTPATIENT
Start: 2022-06-08 | End: 2023-07-03 | Stop reason: SDUPTHER

## 2022-06-08 NOTE — PROGRESS NOTES
Pulmonary Outpatient Follow Up Visit     Subjective:       Patient ID: Jace Dean is a 77 y.o. female.    Chief Complaint: Interstitial Lung Disease      HPI          76 y o f pt presenting for 3 months f / up.     Initially evaluated 10/2021 for worsening chronic dry cough and shortness of breath at rest and on exertion over > 1 YR .        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       Denies previous history of asthma.       Mild relief of coughing and SOB with albuterol.       She has seen Dr. King  in the past.       Diagnosed with interstitial lung disease with autoimmune features likely related to rheumatoid arthritis elevated rheumatoid factor 150, CellCept suggested by Dr. Banks Rheumatology but the patient was concerned about side effects i.e. cancer due the fact that she has family history of ovarian and breast cancers.      Complains of claudication of her lower extremity at rest and on exertion.  Arterial lower extremity ultrasound unremarkable.    I started on prednisone 10 mg and she does feel a drastic improvement in her shortness of breath.  Spirometry with DLCO today shows a significant DLCO improvement.    Review of Systems   Constitutional: Positive for fatigue. Negative for fever and chills.   HENT: Negative for nosebleeds.    Eyes: Negative for redness.   Respiratory: Positive for cough, shortness of breath, dyspnea on extertion and use of rescue inhaler. Negative for choking.    Cardiovascular: Positive for chest pain.        Bilateral lower extremity claudication   Genitourinary: Negative for hematuria.   Endocrine: Negative for cold intolerance.    Musculoskeletal: Positive for arthralgias.   Skin: Negative for rash.   Gastrointestinal: Negative for vomiting.   Neurological: Negative for syncope.   Hematological: Negative for adenopathy.   Psychiatric/Behavioral: Negative for  "confusion.       Outpatient Encounter Medications as of 6/8/2022   Medication Sig Dispense Refill    albuterol (PROVENTIL) 2.5 mg /3 mL (0.083 %) nebulizer solution Take 2.5 mg by nebulization every 6 (six) hours as needed.      albuterol sulfate (PROAIR RESPICLICK) 90 mcg/actuation inhaler Inhale into the lungs 4 (four) times daily as needed.      simvastatin (ZOCOR) 40 MG tablet Take 40 mg by mouth every evening.      [DISCONTINUED] predniSONE (DELTASONE) 10 MG tablet Take 1 tablet (10 mg total) by mouth once daily. 30 tablet 3    losartan (COZAAR) 50 MG tablet Take 50 mg by mouth once daily.      predniSONE (DELTASONE) 5 MG tablet Take 1 tablet (5 mg total) by mouth once daily. 90 tablet 2     No facility-administered encounter medications on file as of 6/8/2022.       Objective:     Vital Signs (Most Recent)  Vital Signs  Pulse: 73  Resp: 16  SpO2: 97 %  BP: 124/78  Height and Weight  Height: 5' 3" (160 cm)  Weight: 76.1 kg (167 lb 12.3 oz)  BSA (Calculated - sq m): 1.84 sq meters  BMI (Calculated): 29.7  Weight in (lb) to have BMI = 25: 140.8]  Wt Readings from Last 2 Encounters:   06/08/22 76.1 kg (167 lb 12.3 oz)   04/08/22 67.1 kg (147 lb 14.9 oz)       Physical Exam   Constitutional: She is oriented to person, place, and time. She appears well-developed and well-nourished. No distress.   HENT:   Head: Normocephalic.   Cardiovascular: Normal rate and regular rhythm.   Pulmonary/Chest: Normal expansion and effort normal. No stridor. No respiratory distress. She has rales. She exhibits no tenderness.   Bilateral crackles ++ lower fields    Abdominal: She exhibits no distension.   Musculoskeletal:         General: No tenderness.      Cervical back: Neck supple.   Lymphadenopathy:     She has no cervical adenopathy.   Neurological: She is alert and oriented to person, place, and time. Gait normal.   Skin: Skin is warm. There is cyanosis. Nails show no clubbing.   Psychiatric: She has a normal mood and " affect. Her behavior is normal. Judgment and thought content normal.   Nursing note and vitals reviewed.      Laboratory  Lab Results   Component Value Date    WBC 7.16 02/23/2022    RBC 3.69 (L) 02/23/2022    HGB 10.8 (L) 02/23/2022    HCT 35.1 (L) 02/23/2022    MCV 95 02/23/2022    MCH 29.3 02/23/2022    MCHC 30.8 (L) 02/23/2022    RDW 13.1 02/23/2022     02/23/2022    MPV 9.9 02/23/2022    GRAN 4.4 02/23/2022    GRAN 62.0 02/23/2022    LYMPH 1.3 02/23/2022    LYMPH 17.6 (L) 02/23/2022    MONO 0.8 02/23/2022    MONO 10.9 02/23/2022    EOS 0.6 (H) 02/23/2022    BASO 0.05 02/23/2022    EOSINOPHIL 8.4 (H) 02/23/2022    BASOPHIL 0.7 02/23/2022       BMP  Lab Results   Component Value Date     01/06/2022    K 4.2 01/06/2022     01/06/2022    CO2 25 01/06/2022    BUN 26 (H) 01/06/2022    CREATININE 1.4 01/06/2022    CALCIUM 10.1 01/06/2022    ANIONGAP 9 01/06/2022    ESTGFRAFRICA 42 (A) 01/06/2022    EGFRNONAA 36 (A) 01/06/2022    AST 19 01/06/2022    ALT 11 01/06/2022    PROT 7.5 01/06/2022       Lab Results   Component Value Date    BNP 44 10/27/2021       No results found for: TSH    Lab Results   Component Value Date    SEDRATE 104 (H) 01/06/2022       Lab Results   Component Value Date    CRP 38.9 (H) 01/06/2022     Lab Results   Component Value Date     (H) 10/27/2021        No results found for: ASPERGILLUS  No results found for: AFUMIGATUSCL     Lab Results   Component Value Date    ACE 27 12/14/2021        Diagnostic Results:  I have personally reviewed today the following studies:        CT CHEST WITHOUT CONTRAST December 2021     CLINICAL HISTORY:  restrictive lung dz; Interstitial pulmonary disease, unspecified     TECHNIQUE:  Low dose axial images, sagittal and coronal reformations were obtained from the thoracic inlet to the lung bases. Contrast was not administered.     COMPARISON:  None     FINDINGS:  Base of Neck: No significant abnormality.     Thoracic soft tissues:  Normal.     Aorta: Left-sided aortic arch.  Mild atherosclerosis.  No aneurysm.     Heart: Normal size.  Minimal fluid in the pericardial recesses.     Pulmonary vasculature: Pulmonary arteries distribute normally.  There are four pulmonary veins.     Daysi/Mediastinum: Multiple subcentimeter in short axis mediastinal lymph nodes.  No pathologic mercedez enlargement.     Airways: Patent.     Lungs/Pleura: Bilateral peripheral and basilar distribution of subpleural reticulation, most pronounced in the lower lobes and lingula with associated honeycombing.  No suspicious pulmonary nodules/masses.  Scattered low-grade ground-glass opacities also noted.  No consolidation or pleural effusion.     Esophagus: Normal.     Upper Abdomen: No abnormality of the partially imaged upper abdomen.     Bones: No acute fracture. No suspicious lytic or sclerotic lesions.     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                CXR 10/27/2021    FINDINGS:  Mild hyperinflation with diffuse coarsening interstitial markings bilaterally.  This most pronounced in the mid and lower lung fields, greater on the left.  No consolidation, effusion or pneumothorax.  Bilateral apical pleural thickening.  Narrowing cardiomediastinal contour and tortuous aorta with underlying atherosclerotic calcification.  Trachea is midline.  There is osteopenia and spondylosis.  Minimal anterior wedging T12 level which is of uncertain chronicity.  Correlate for point tenderness.            Nuc stress 11/29/2021      Normal myocardial perfusion scan. There is no evidence of myocardial ischemia or infarction.    The gated perfusion images showed an ejection fraction of 88% at rest. The gated perfusion images showed an ejection fraction of 86% post stress.    The EKG portion of this study is negative for ischemia.    There were no arrhythmias during stress.       Echo 10/2021      Summary    · Concentric hypertrophy and  normal systolic function.  · The ascending aorta is mildly dilated.  · Mild tricuspid regurgitation.  · The estimated ejection fraction is 60%.  · Normal left ventricular diastolic function.  · With normal right ventricular systolic function.  · Normal central venous pressure (3 mmHg).  · The estimated PA systolic pressure is 26 mmHg.      Six min 11/2021    Interpretation:   Total distance walked in six minutes is   severelyreduced and therefore  the overall  functional capacity is   severely reduced. There was no significant exercise induced hypoxemia.       PFT 2021    Grade A-D -acceptable quality of tracingsModerate restriction based on reduced Forced Vital Capacity (FVC). Consider lung volumes if clinically indicated.Mild restriction. (TLC< LLN and > or equal to 70% of predicted).Diffusion capacity is severely   reduced - unadjusted for hemoglobin (DLCO < 40% predicted).Maximum voluntary ventilation is moderately reduced. (MVV% predicted is 51% to 65% of predicted)Flow volume loops demonstrate a restrictive defect.Overall there is no significant ventilatory   impairment. (FEV1 >LLN)Flow volume loops demonstrate a restrictive defect.Pattern of restriction and decreased diffusion suggest interstitial pulmonary fibrosis. Clinical correlation suggested.       Spirometry 06/08/2022 review today    Mild restriction with moderately severe DLCO reduction however DLCO have a fluid from 33% to 45%      Assessment/Plan:   ILD (interstitial lung disease)  -     Spirometry Without Bronchodilator & DLCO; Future; Expected date: 09/08/2022  -     Stress test, pulmonary; Future; Expected date: 09/08/2022  -     predniSONE (DELTASONE) 5 MG tablet; Take 1 tablet (5 mg total) by mouth once daily.  Dispense: 90 tablet; Refill: 2  -     X-Ray Chest PA And Lateral; Future; Expected date: 09/08/2022    Diffuse interstitial rheumatoid disease of lung  -     Spirometry Without Bronchodilator & DLCO; Future; Expected date: 09/08/2022  -      Stress test, pulmonary; Future; Expected date: 09/08/2022  -     predniSONE (DELTASONE) 5 MG tablet; Take 1 tablet (5 mg total) by mouth once daily.  Dispense: 90 tablet; Refill: 2  -     X-Ray Chest PA And Lateral; Future; Expected date: 09/08/2022    Restrictive lung disease  -     Spirometry Without Bronchodilator & DLCO; Future; Expected date: 09/08/2022  -     Stress test, pulmonary; Future; Expected date: 09/08/2022  -     predniSONE (DELTASONE) 5 MG tablet; Take 1 tablet (5 mg total) by mouth once daily.  Dispense: 90 tablet; Refill: 2  -     X-Ray Chest PA And Lateral; Future; Expected date: 09/08/2022    Current chronic use of systemic steroids  -     Spirometry Without Bronchodilator & DLCO; Future; Expected date: 09/08/2022  -     Stress test, pulmonary; Future; Expected date: 09/08/2022  -     predniSONE (DELTASONE) 5 MG tablet; Take 1 tablet (5 mg total) by mouth once daily.  Dispense: 90 tablet; Refill: 2  -     X-Ray Chest PA And Lateral; Future; Expected date: 09/08/2022    Medication care plan discussed with patient  -     Spirometry Without Bronchodilator & DLCO; Future; Expected date: 09/08/2022  -     Stress test, pulmonary; Future; Expected date: 09/08/2022  -     predniSONE (DELTASONE) 5 MG tablet; Take 1 tablet (5 mg total) by mouth once daily.  Dispense: 90 tablet; Refill: 2  -     X-Ray Chest PA And Lateral; Future; Expected date: 09/08/2022       Favorable response to prednisone 10 mg clinically and on DLCO trend.    Decreased to 5 mg daily.    Patient to see Dr. MALONE rheumatology August 2, 2022.    Has not proceeded with CellCept therapy suggested by Dr. Banks rheumatology due to concerns about malignancy and family history of ovarian and breast CA.    Advised patient that CellCept is used as a steroid sparing agent and will be introduced usually  for 6-12 months with monitoring her blood count and for side effects.    She would like to continue on prednisone for now and discuss CellCept  with Dr. JOHNSON    Repeat chest x-ray spirometry with DLCO and 6 minute walking test in 3 months.    PCV 20 or P PSV 23 next visit  Follow up in about 6 months (around 12/8/2022).    This note was prepared using voice recognition system and is likely to have sound alike errors that may have been overlooked even after proof reading.  Please call me with any questions    Discussed diagnosis, its evaluation, treatment and usual course. All questions answered.      Emmanuel Patterson MD

## 2022-06-09 LAB
BRPFT: NORMAL
DLCO ADJ PRE: 8.85 ML/(MIN*MMHG)
DLCO SINGLE BREATH LLN: 13.61
DLCO SINGLE BREATH PRE REF: 45.8 %
DLCO SINGLE BREATH REF: 19.34
DLCOC SBVA LLN: 2.59
DLCOC SBVA PRE REF: 86.1 %
DLCOC SBVA REF: 4.05
DLCOC SINGLE BREATH LLN: 13.61
DLCOC SINGLE BREATH PRE REF: 45.8 %
DLCOC SINGLE BREATH REF: 19.34
DLCOVA LLN: 2.59
DLCOVA PRE REF: 86.1 %
DLCOVA PRE: 3.49 ML/(MIN*MMHG*L)
DLCOVA REF: 4.05
DLVAADJ PRE: 3.49 ML/(MIN*MMHG*L)
FEF 25 75 LLN: 0.65
FEF 25 75 PRE REF: 106.3 %
FEF 25 75 REF: 1.54
FEV1 FVC LLN: 63
FEV1 FVC PRE REF: 105.8 %
FEV1 FVC REF: 77
FEV1 LLN: 1.33
FEV1 PRE REF: 74.1 %
FEV1 REF: 1.89
FVC LLN: 1.74
FVC PRE REF: 69.1 %
FVC REF: 2.48
IVC PRE: 1.59 L
IVC SINGLE BREATH LLN: 1.74
IVC SINGLE BREATH PRE REF: 64.2 %
IVC SINGLE BREATH REF: 2.48
PEF LLN: 3.08
PEF PRE REF: 87.4 %
PEF REF: 4.73
PRE DLCO: 8.85 ML/(MIN*MMHG)
PRE FEF 25 75: 1.64 L/S
PRE FET 100: 7.45 SEC
PRE FEV1 FVC: 81.72 %
PRE FEV1: 1.4 L
PRE FVC: 1.71 L
PRE PEF: 4.14 L/S
VA PRE: 2.54 L
VA SINGLE BREATH LLN: 4.62
VA SINGLE BREATH PRE REF: 55 %
VA SINGLE BREATH REF: 4.62

## 2022-08-02 ENCOUNTER — LAB VISIT (OUTPATIENT)
Dept: LAB | Facility: HOSPITAL | Age: 77
End: 2022-08-02
Attending: INTERNAL MEDICINE
Payer: MEDICARE

## 2022-08-02 ENCOUNTER — OFFICE VISIT (OUTPATIENT)
Dept: RHEUMATOLOGY | Facility: CLINIC | Age: 77
End: 2022-08-02
Payer: MEDICARE

## 2022-08-02 VITALS
BODY MASS INDEX: 30.86 KG/M2 | DIASTOLIC BLOOD PRESSURE: 83 MMHG | WEIGHT: 174.19 LBS | HEART RATE: 94 BPM | SYSTOLIC BLOOD PRESSURE: 129 MMHG | HEIGHT: 63 IN

## 2022-08-02 DIAGNOSIS — Z78.0 POSTMENOPAUSAL: ICD-10-CM

## 2022-08-02 DIAGNOSIS — J84.9 ILD (INTERSTITIAL LUNG DISEASE): Primary | ICD-10-CM

## 2022-08-02 DIAGNOSIS — R76.8 RHEUMATOID FACTOR POSITIVE: ICD-10-CM

## 2022-08-02 DIAGNOSIS — Z51.81 ENCOUNTER FOR MEDICATION MONITORING: ICD-10-CM

## 2022-08-02 DIAGNOSIS — J84.9 ILD (INTERSTITIAL LUNG DISEASE): ICD-10-CM

## 2022-08-02 LAB
ALBUMIN SERPL BCP-MCNC: 3.2 G/DL (ref 3.5–5.2)
ALP SERPL-CCNC: 77 U/L (ref 55–135)
ALT SERPL W/O P-5'-P-CCNC: 16 U/L (ref 10–44)
ANION GAP SERPL CALC-SCNC: 9 MMOL/L (ref 8–16)
AST SERPL-CCNC: 16 U/L (ref 10–40)
BASOPHILS # BLD AUTO: 0.05 K/UL (ref 0–0.2)
BASOPHILS NFR BLD: 0.5 % (ref 0–1.9)
BILIRUB SERPL-MCNC: 0.3 MG/DL (ref 0.1–1)
BUN SERPL-MCNC: 42 MG/DL (ref 8–23)
CALCIUM SERPL-MCNC: 10.1 MG/DL (ref 8.7–10.5)
CHLORIDE SERPL-SCNC: 106 MMOL/L (ref 95–110)
CO2 SERPL-SCNC: 27 MMOL/L (ref 23–29)
CREAT SERPL-MCNC: 1.3 MG/DL (ref 0.5–1.4)
DIFFERENTIAL METHOD: ABNORMAL
EOSINOPHIL # BLD AUTO: 0.3 K/UL (ref 0–0.5)
EOSINOPHIL NFR BLD: 2.8 % (ref 0–8)
ERYTHROCYTE [DISTWIDTH] IN BLOOD BY AUTOMATED COUNT: 12.7 % (ref 11.5–14.5)
EST. GFR  (NO RACE VARIABLE): 42 ML/MIN/1.73 M^2
GLUCOSE SERPL-MCNC: 73 MG/DL (ref 70–110)
HCT VFR BLD AUTO: 41 % (ref 37–48.5)
HGB BLD-MCNC: 13.2 G/DL (ref 12–16)
IMM GRANULOCYTES # BLD AUTO: 0.04 K/UL (ref 0–0.04)
IMM GRANULOCYTES NFR BLD AUTO: 0.4 % (ref 0–0.5)
LYMPHOCYTES # BLD AUTO: 2 K/UL (ref 1–4.8)
LYMPHOCYTES NFR BLD: 20.4 % (ref 18–48)
MCH RBC QN AUTO: 31.4 PG (ref 27–31)
MCHC RBC AUTO-ENTMCNC: 32.2 G/DL (ref 32–36)
MCV RBC AUTO: 98 FL (ref 82–98)
MONOCYTES # BLD AUTO: 1.2 K/UL (ref 0.3–1)
MONOCYTES NFR BLD: 12.1 % (ref 4–15)
NEUTROPHILS # BLD AUTO: 6.2 K/UL (ref 1.8–7.7)
NEUTROPHILS NFR BLD: 63.8 % (ref 38–73)
NRBC BLD-RTO: 0 /100 WBC
PLATELET # BLD AUTO: 216 K/UL (ref 150–450)
PMV BLD AUTO: 9.7 FL (ref 9.2–12.9)
POTASSIUM SERPL-SCNC: 4.4 MMOL/L (ref 3.5–5.1)
PROT SERPL-MCNC: 7 G/DL (ref 6–8.4)
RBC # BLD AUTO: 4.2 M/UL (ref 4–5.4)
SODIUM SERPL-SCNC: 142 MMOL/L (ref 136–145)
WBC # BLD AUTO: 9.75 K/UL (ref 3.9–12.7)

## 2022-08-02 PROCEDURE — 3074F SYST BP LT 130 MM HG: CPT | Mod: CPTII,S$GLB,, | Performed by: INTERNAL MEDICINE

## 2022-08-02 PROCEDURE — 1160F PR REVIEW ALL MEDS BY PRESCRIBER/CLIN PHARMACIST DOCUMENTED: ICD-10-PCS | Mod: CPTII,S$GLB,, | Performed by: INTERNAL MEDICINE

## 2022-08-02 PROCEDURE — 99215 PR OFFICE/OUTPT VISIT, EST, LEVL V, 40-54 MIN: ICD-10-PCS | Mod: S$GLB,,, | Performed by: INTERNAL MEDICINE

## 2022-08-02 PROCEDURE — 1159F MED LIST DOCD IN RCRD: CPT | Mod: CPTII,S$GLB,, | Performed by: INTERNAL MEDICINE

## 2022-08-02 PROCEDURE — 85025 COMPLETE CBC W/AUTO DIFF WBC: CPT | Performed by: INTERNAL MEDICINE

## 2022-08-02 PROCEDURE — 3079F DIAST BP 80-89 MM HG: CPT | Mod: CPTII,S$GLB,, | Performed by: INTERNAL MEDICINE

## 2022-08-02 PROCEDURE — 80053 COMPREHEN METABOLIC PANEL: CPT | Performed by: INTERNAL MEDICINE

## 2022-08-02 PROCEDURE — 36415 COLL VENOUS BLD VENIPUNCTURE: CPT | Performed by: INTERNAL MEDICINE

## 2022-08-02 PROCEDURE — 1101F PR PT FALLS ASSESS DOC 0-1 FALLS W/OUT INJ PAST YR: ICD-10-PCS | Mod: CPTII,S$GLB,, | Performed by: INTERNAL MEDICINE

## 2022-08-02 PROCEDURE — 3288F FALL RISK ASSESSMENT DOCD: CPT | Mod: CPTII,S$GLB,, | Performed by: INTERNAL MEDICINE

## 2022-08-02 PROCEDURE — 3079F PR MOST RECENT DIASTOLIC BLOOD PRESSURE 80-89 MM HG: ICD-10-PCS | Mod: CPTII,S$GLB,, | Performed by: INTERNAL MEDICINE

## 2022-08-02 PROCEDURE — 1160F RVW MEDS BY RX/DR IN RCRD: CPT | Mod: CPTII,S$GLB,, | Performed by: INTERNAL MEDICINE

## 2022-08-02 PROCEDURE — 3074F PR MOST RECENT SYSTOLIC BLOOD PRESSURE < 130 MM HG: ICD-10-PCS | Mod: CPTII,S$GLB,, | Performed by: INTERNAL MEDICINE

## 2022-08-02 PROCEDURE — 99215 OFFICE O/P EST HI 40 MIN: CPT | Mod: S$GLB,,, | Performed by: INTERNAL MEDICINE

## 2022-08-02 PROCEDURE — 3288F PR FALLS RISK ASSESSMENT DOCUMENTED: ICD-10-PCS | Mod: CPTII,S$GLB,, | Performed by: INTERNAL MEDICINE

## 2022-08-02 PROCEDURE — 1126F PR PAIN SEVERITY QUANTIFIED, NO PAIN PRESENT: ICD-10-PCS | Mod: CPTII,S$GLB,, | Performed by: INTERNAL MEDICINE

## 2022-08-02 PROCEDURE — 99999 PR PBB SHADOW E&M-EST. PATIENT-LVL IV: ICD-10-PCS | Mod: PBBFAC,,, | Performed by: INTERNAL MEDICINE

## 2022-08-02 PROCEDURE — 99999 PR PBB SHADOW E&M-EST. PATIENT-LVL IV: CPT | Mod: PBBFAC,,, | Performed by: INTERNAL MEDICINE

## 2022-08-02 PROCEDURE — 1126F AMNT PAIN NOTED NONE PRSNT: CPT | Mod: CPTII,S$GLB,, | Performed by: INTERNAL MEDICINE

## 2022-08-02 PROCEDURE — 1159F PR MEDICATION LIST DOCUMENTED IN MEDICAL RECORD: ICD-10-PCS | Mod: CPTII,S$GLB,, | Performed by: INTERNAL MEDICINE

## 2022-08-02 PROCEDURE — 1101F PT FALLS ASSESS-DOCD LE1/YR: CPT | Mod: CPTII,S$GLB,, | Performed by: INTERNAL MEDICINE

## 2022-08-02 RX ORDER — MYCOPHENOLATE MOFETIL 500 MG/1
500 TABLET ORAL 2 TIMES DAILY
Qty: 60 TABLET | Refills: 1 | Status: SHIPPED | OUTPATIENT
Start: 2022-08-02 | End: 2022-09-19

## 2022-08-02 RX ORDER — FUROSEMIDE 20 MG/1
20 TABLET ORAL DAILY PRN
Qty: 90 TABLET | Refills: 0 | Status: SHIPPED | OUTPATIENT
Start: 2022-08-02

## 2022-08-02 NOTE — PROGRESS NOTES
RHEUMATOLOGY CLINIC FOLLOW UP VISIT-1st visit with me.  Chief complaints, HPI, ROS, EXAM, Assessment & Plans:-  Jace Gaxiola a 77 y.o. pleasant female comes in for follow-up visit.  Seen by my associate for interstitial lung disease associated with elevated rheumatoid factor-UIP pattern .  Responding well to prednisone.  Advised by her pulmonologist to start steroid sparing therapy-CellCept.  Here to discuss potential risks and adverse effects.  Rheumatological review of system negative otherwise.  Physical examination shows no synovitis of small joints.  No sclerodactyly.  No telangiectasia.  Normal nail fold capillaries.    1. ILD (interstitial lung disease)    2. Rheumatoid factor positive    3. Encounter for medication monitoring      Problem List Items Addressed This Visit     ILD (interstitial lung disease) - Primary    Relevant Medications    mycophenolate (CELLCEPT) 500 mg Tab    furosemide (LASIX) 20 MG tablet    Other Relevant Orders    CBC Auto Differential    Comprehensive Metabolic Panel    Rheumatoid factor positive    Encounter for medication monitoring    Relevant Orders    CBC Auto Differential    Comprehensive Metabolic Panel           Discussed in detail about rule of steroid sparing therapy especially CellCept in the context of connective tissue disease related interstitial lung disease.  Answered all her questions and concerns.  She would like to give CellCept to try.   Start low-dose and repeat safety labs in 4 weeks.   Lasix refill provided for patient's request for pedal edema which she uses p.r.n..   Continue follow-up with pulmonologist for monitoring lung functions and advice on prednisone taper.   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 Monitor carefully for infections. Advised to get immediate medical care if any infection. Also advised strict adherence to age  appropriate vaccinations and cancer screenings with PCP.   # Follow up in about 4 weeks (around 8/30/2022).      Disclaimer: This note was prepared using voice recognition system and is likely to have sound alike errors and is not proof read.  Please call me with any questions.

## 2022-09-19 ENCOUNTER — OFFICE VISIT (OUTPATIENT)
Dept: RHEUMATOLOGY | Facility: CLINIC | Age: 77
End: 2022-09-19
Payer: MEDICARE

## 2022-09-19 ENCOUNTER — LAB VISIT (OUTPATIENT)
Dept: LAB | Facility: HOSPITAL | Age: 77
End: 2022-09-19
Attending: FAMILY MEDICINE
Payer: MEDICARE

## 2022-09-19 ENCOUNTER — TELEPHONE (OUTPATIENT)
Dept: RHEUMATOLOGY | Facility: CLINIC | Age: 77
End: 2022-09-19

## 2022-09-19 VITALS
DIASTOLIC BLOOD PRESSURE: 87 MMHG | BODY MASS INDEX: 30.39 KG/M2 | SYSTOLIC BLOOD PRESSURE: 124 MMHG | WEIGHT: 171.5 LBS | HEIGHT: 63 IN | HEART RATE: 105 BPM

## 2022-09-19 DIAGNOSIS — Z51.81 ENCOUNTER FOR MEDICATION MONITORING: ICD-10-CM

## 2022-09-19 DIAGNOSIS — J84.9 ILD (INTERSTITIAL LUNG DISEASE): Primary | ICD-10-CM

## 2022-09-19 DIAGNOSIS — J84.9 ILD (INTERSTITIAL LUNG DISEASE): ICD-10-CM

## 2022-09-19 DIAGNOSIS — D84.9 IMMUNOSUPPRESSION: ICD-10-CM

## 2022-09-19 DIAGNOSIS — R76.8 RHEUMATOID FACTOR POSITIVE: ICD-10-CM

## 2022-09-19 DIAGNOSIS — Z51.81 MEDICATION MONITORING ENCOUNTER: ICD-10-CM

## 2022-09-19 LAB
ALBUMIN SERPL BCP-MCNC: 3.7 G/DL (ref 3.5–5.2)
ALP SERPL-CCNC: 59 U/L (ref 55–135)
ALT SERPL W/O P-5'-P-CCNC: 13 U/L (ref 10–44)
ANION GAP SERPL CALC-SCNC: 12 MMOL/L (ref 8–16)
AST SERPL-CCNC: 17 U/L (ref 10–40)
BASOPHILS # BLD AUTO: 0.03 K/UL (ref 0–0.2)
BASOPHILS NFR BLD: 0.4 % (ref 0–1.9)
BILIRUB SERPL-MCNC: 0.6 MG/DL (ref 0.1–1)
BUN SERPL-MCNC: 30 MG/DL (ref 8–23)
CALCIUM SERPL-MCNC: 10.1 MG/DL (ref 8.7–10.5)
CHLORIDE SERPL-SCNC: 105 MMOL/L (ref 95–110)
CO2 SERPL-SCNC: 24 MMOL/L (ref 23–29)
CREAT SERPL-MCNC: 1.6 MG/DL (ref 0.5–1.4)
DIFFERENTIAL METHOD: ABNORMAL
EOSINOPHIL # BLD AUTO: 0.1 K/UL (ref 0–0.5)
EOSINOPHIL NFR BLD: 1.4 % (ref 0–8)
ERYTHROCYTE [DISTWIDTH] IN BLOOD BY AUTOMATED COUNT: 12.6 % (ref 11.5–14.5)
EST. GFR  (NO RACE VARIABLE): 33 ML/MIN/1.73 M^2
GLUCOSE SERPL-MCNC: 122 MG/DL (ref 70–110)
HCT VFR BLD AUTO: 41.3 % (ref 37–48.5)
HGB BLD-MCNC: 13.4 G/DL (ref 12–16)
IMM GRANULOCYTES # BLD AUTO: 0.03 K/UL (ref 0–0.04)
IMM GRANULOCYTES NFR BLD AUTO: 0.4 % (ref 0–0.5)
LYMPHOCYTES # BLD AUTO: 1 K/UL (ref 1–4.8)
LYMPHOCYTES NFR BLD: 13.3 % (ref 18–48)
MCH RBC QN AUTO: 30.9 PG (ref 27–31)
MCHC RBC AUTO-ENTMCNC: 32.4 G/DL (ref 32–36)
MCV RBC AUTO: 95 FL (ref 82–98)
MONOCYTES # BLD AUTO: 0.3 K/UL (ref 0.3–1)
MONOCYTES NFR BLD: 4.5 % (ref 4–15)
NEUTROPHILS # BLD AUTO: 5.9 K/UL (ref 1.8–7.7)
NEUTROPHILS NFR BLD: 80 % (ref 38–73)
NRBC BLD-RTO: 0 /100 WBC
PLATELET # BLD AUTO: 200 K/UL (ref 150–450)
PMV BLD AUTO: 9.8 FL (ref 9.2–12.9)
POTASSIUM SERPL-SCNC: 4 MMOL/L (ref 3.5–5.1)
PROT SERPL-MCNC: 7.2 G/DL (ref 6–8.4)
RBC # BLD AUTO: 4.33 M/UL (ref 4–5.4)
SODIUM SERPL-SCNC: 141 MMOL/L (ref 136–145)
WBC # BLD AUTO: 7.39 K/UL (ref 3.9–12.7)

## 2022-09-19 PROCEDURE — 1101F PT FALLS ASSESS-DOCD LE1/YR: CPT | Mod: CPTII,S$GLB,, | Performed by: PHYSICIAN ASSISTANT

## 2022-09-19 PROCEDURE — 99999 PR PBB SHADOW E&M-EST. PATIENT-LVL III: ICD-10-PCS | Mod: PBBFAC,,, | Performed by: PHYSICIAN ASSISTANT

## 2022-09-19 PROCEDURE — 99215 OFFICE O/P EST HI 40 MIN: CPT | Mod: S$GLB,,, | Performed by: PHYSICIAN ASSISTANT

## 2022-09-19 PROCEDURE — 3074F SYST BP LT 130 MM HG: CPT | Mod: CPTII,S$GLB,, | Performed by: PHYSICIAN ASSISTANT

## 2022-09-19 PROCEDURE — 3079F DIAST BP 80-89 MM HG: CPT | Mod: CPTII,S$GLB,, | Performed by: PHYSICIAN ASSISTANT

## 2022-09-19 PROCEDURE — 1159F MED LIST DOCD IN RCRD: CPT | Mod: CPTII,S$GLB,, | Performed by: PHYSICIAN ASSISTANT

## 2022-09-19 PROCEDURE — 99999 PR PBB SHADOW E&M-EST. PATIENT-LVL III: CPT | Mod: PBBFAC,,, | Performed by: PHYSICIAN ASSISTANT

## 2022-09-19 PROCEDURE — 80053 COMPREHEN METABOLIC PANEL: CPT | Performed by: INTERNAL MEDICINE

## 2022-09-19 PROCEDURE — 3074F PR MOST RECENT SYSTOLIC BLOOD PRESSURE < 130 MM HG: ICD-10-PCS | Mod: CPTII,S$GLB,, | Performed by: PHYSICIAN ASSISTANT

## 2022-09-19 PROCEDURE — 3288F PR FALLS RISK ASSESSMENT DOCUMENTED: ICD-10-PCS | Mod: CPTII,S$GLB,, | Performed by: PHYSICIAN ASSISTANT

## 2022-09-19 PROCEDURE — 1101F PR PT FALLS ASSESS DOC 0-1 FALLS W/OUT INJ PAST YR: ICD-10-PCS | Mod: CPTII,S$GLB,, | Performed by: PHYSICIAN ASSISTANT

## 2022-09-19 PROCEDURE — 3288F FALL RISK ASSESSMENT DOCD: CPT | Mod: CPTII,S$GLB,, | Performed by: PHYSICIAN ASSISTANT

## 2022-09-19 PROCEDURE — 3079F PR MOST RECENT DIASTOLIC BLOOD PRESSURE 80-89 MM HG: ICD-10-PCS | Mod: CPTII,S$GLB,, | Performed by: PHYSICIAN ASSISTANT

## 2022-09-19 PROCEDURE — 85025 COMPLETE CBC W/AUTO DIFF WBC: CPT | Performed by: INTERNAL MEDICINE

## 2022-09-19 PROCEDURE — 36415 COLL VENOUS BLD VENIPUNCTURE: CPT | Performed by: INTERNAL MEDICINE

## 2022-09-19 PROCEDURE — 1126F PR PAIN SEVERITY QUANTIFIED, NO PAIN PRESENT: ICD-10-PCS | Mod: CPTII,S$GLB,, | Performed by: PHYSICIAN ASSISTANT

## 2022-09-19 PROCEDURE — 1159F PR MEDICATION LIST DOCUMENTED IN MEDICAL RECORD: ICD-10-PCS | Mod: CPTII,S$GLB,, | Performed by: PHYSICIAN ASSISTANT

## 2022-09-19 PROCEDURE — 1126F AMNT PAIN NOTED NONE PRSNT: CPT | Mod: CPTII,S$GLB,, | Performed by: PHYSICIAN ASSISTANT

## 2022-09-19 PROCEDURE — 99215 PR OFFICE/OUTPT VISIT, EST, LEVL V, 40-54 MIN: ICD-10-PCS | Mod: S$GLB,,, | Performed by: PHYSICIAN ASSISTANT

## 2022-09-19 RX ORDER — MYCOPHENOLATE MOFETIL 500 MG/1
1000 TABLET ORAL 2 TIMES DAILY
Qty: 120 TABLET | Refills: 1 | Status: SHIPPED | OUTPATIENT
Start: 2022-09-19 | End: 2022-10-24

## 2022-09-19 NOTE — Clinical Note
Hi Dr. Patterson, Pt feeling much more SOB and having a harder time doing ADLs. I did increase her cellcept today to 1000 mg BID. Would you be ok with her going back to prednisone 5 mg daily?

## 2022-09-19 NOTE — PROGRESS NOTES
"     RHEUMATOLOGY OUTPATIENT CLINIC NOTE    9/19/2022    Attending Rheumatologist: Janie Roldan PA-C  Primary Care Provider: Lopez Smalls MD   Chief Complaint/Reason For Consultation:  Disease Management      Subjective:        Jace Dean is a 77 y.o. female here today for routine follow up of ILD associated with elevated rheumatoid factor-UIP pattern. Responded well to prednisone. started on cellcept 500 mg BID and reduced her prednisone to 5 mg qod. Pt states she is feeling much worse; harder time breathing and doing ADLs. Doesn't feel like cellcept is working.  Rheumatological review of system negative otherwise.  Physical examination shows no synovitis of small joints.  No sclerodactyly.  No telangiectasia.  Normal nail fold capillaries.      Serologies  Lab Results   Component Value Date    TBGOLDPLUS Negative 01/06/2022     Lab Results   Component Value Date    .0 (H) 12/14/2021     Lab Results   Component Value Date    HEPAIGM Negative 01/06/2022    HEPBIGM Negative 01/06/2022    HEPCAB Negative 01/06/2022        Current Rheum Medications:  Cellcept 500 mg BID, prednisone 5 mg qod  Previous Rheum Medications:   Daily prednisone- 10 mg and 5 mg    Past Medical History:   Diagnosis Date    Hypertension      Social History     Socioeconomic History    Marital status:    Tobacco Use    Smoking status: Never    Smokeless tobacco: Never   Substance and Sexual Activity    Alcohol use: No     Comment: OCC     Review of patient's allergies indicates:   Allergen Reactions    Meperidine     Penicillins     Tetracyclines        Objective:   /87   Pulse 105   Ht 5' 3" (1.6 m)   Wt 77.8 kg (171 lb 8.3 oz)   BMI 30.38 kg/m²     Immunization History   Administered Date(s) Administered    COVID-19, MRNA, LN-S, PF (Pfizer) (Purple Cap) 06/24/2021, 07/22/2021    Influenza - Trivalent (ADULT) 11/23/2011    Influenza A (H1N1) 2009 Monovalent - IM 01/19/2010    Pneumococcal Conjugate - 13 " Hermelinda 12/14/2021    Tdap 07/24/2018       Current Outpatient Medications:     albuterol (PROVENTIL) 2.5 mg /3 mL (0.083 %) nebulizer solution, Take 2.5 mg by nebulization every 6 (six) hours as needed., Disp: , Rfl:     albuterol sulfate (PROAIR RESPICLICK) 90 mcg/actuation inhaler, Inhale into the lungs 4 (four) times daily as needed., Disp: , Rfl:     calcium carbonate (CALCIUM 500 ORAL), Take by mouth., Disp: , Rfl:     furosemide (LASIX) 20 MG tablet, Take 1 tablet (20 mg total) by mouth daily as needed (edema)., Disp: 90 tablet, Rfl: 0    mv-mn/iron/folic acid/herb 190 (VITAMIN D3 COMPLETE ORAL), Take by mouth., Disp: , Rfl:     predniSONE (DELTASONE) 5 MG tablet, Take 1 tablet (5 mg total) by mouth once daily., Disp: 90 tablet, Rfl: 2    simvastatin (ZOCOR) 40 MG tablet, Take 40 mg by mouth every evening., Disp: , Rfl:     mycophenolate (CELLCEPT) 500 mg Tab, Take 2 tablets (1,000 mg total) by mouth 2 (two) times daily., Disp: 120 tablet, Rfl: 1       Recent Results (from the past 336 hour(s))   CBC Auto Differential    Collection Time: 09/19/22  1:06 PM   Result Value Ref Range    WBC 7.39 3.90 - 12.70 K/uL    RBC 4.33 4.00 - 5.40 M/uL    Hemoglobin 13.4 12.0 - 16.0 g/dL    Hematocrit 41.3 37.0 - 48.5 %    MCV 95 82 - 98 fL    MCH 30.9 27.0 - 31.0 pg    MCHC 32.4 32.0 - 36.0 g/dL    RDW 12.6 11.5 - 14.5 %    Platelets 200 150 - 450 K/uL    MPV 9.8 9.2 - 12.9 fL    Immature Granulocytes 0.4 0.0 - 0.5 %    Gran # (ANC) 5.9 1.8 - 7.7 K/uL    Immature Grans (Abs) 0.03 0.00 - 0.04 K/uL    Lymph # 1.0 1.0 - 4.8 K/uL    Mono # 0.3 0.3 - 1.0 K/uL    Eos # 0.1 0.0 - 0.5 K/uL    Baso # 0.03 0.00 - 0.20 K/uL    nRBC 0 0 /100 WBC    Gran % 80.0 (H) 38.0 - 73.0 %    Lymph % 13.3 (L) 18.0 - 48.0 %    Mono % 4.5 4.0 - 15.0 %    Eosinophil % 1.4 0.0 - 8.0 %    Basophil % 0.4 0.0 - 1.9 %    Differential Method Automated    Comprehensive Metabolic Panel    Collection Time: 09/19/22  1:06 PM   Result Value Ref Range    Sodium  141 136 - 145 mmol/L    Potassium 4.0 3.5 - 5.1 mmol/L    Chloride 105 95 - 110 mmol/L    CO2 24 23 - 29 mmol/L    Glucose 122 (H) 70 - 110 mg/dL    BUN 30 (H) 8 - 23 mg/dL    Creatinine 1.6 (H) 0.5 - 1.4 mg/dL    Calcium 10.1 8.7 - 10.5 mg/dL    Total Protein 7.2 6.0 - 8.4 g/dL    Albumin 3.7 3.5 - 5.2 g/dL    Total Bilirubin 0.6 0.1 - 1.0 mg/dL    Alkaline Phosphatase 59 55 - 135 U/L    AST 17 10 - 40 U/L    ALT 13 10 - 44 U/L    Anion Gap 12 8 - 16 mmol/L    eGFR 33 (A) >60 mL/min/1.73 m^2       Assessment:     1. ILD (interstitial lung disease)    2. Rheumatoid factor positive    3. Encounter for medication monitoring    4. Medication monitoring encounter    5. 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 Monitor carefully for infections and toxicities- Currently denies issues with recurrent infections. Advised to get immediate medical care if any infection.  Recommend Yearly Skin Cancer Screening by Dermatology for all patients on biologic or Sarah. advised strict adherence to age appropriate vaccinations (shingles, pneumonia, flu and covid) and cancer screenings with PCP   Patient advised to hold DMARD and/or biologic therapy for signs of infection or for surgery. If you are unsure what to do please call our office for instruction.[facility] [phone]  no current medication related issues, no evidence of toxicity. I ordered labs for toxicity monitoring;  results reviewed and discussed findings with the patient   Patient understands and contact clinic at any time regarding questions about today's office visit and any medication changes that were made  Return to clinic: 1 month w/ early lab    The patient understands, chooses and consents to this plan and accepts all the risks which include but are not limited  to the risks mentioned above.     Method of contact with patient concerns: [facility]    60 minutes of total time spent on the encounter, which includes face to face time and non-face to face time preparing to see the patient (eg, review of tests), Obtaining and/or reviewing separately obtained history, Documenting clinical information in the electronic or other health record, Independently interpreting results (not separately reported) and communicating results to the patient/family/caregiver, or Care coordination (not separately reported).          Disclaimer: This note was prepared using a voice recognition system and is likely to have sound alike errors within the text.       Janie Roldan PA-C  Rheumatology Department   [facility] [place]

## 2022-09-19 NOTE — TELEPHONE ENCOUNTER
----- Message from Janie Roldan PA-C sent at 9/19/2022  3:02 PM CDT -----  Please schedule in 4 weeks w/ early reg 4

## 2022-09-22 ENCOUNTER — TELEPHONE (OUTPATIENT)
Dept: RHEUMATOLOGY | Facility: CLINIC | Age: 77
End: 2022-09-22
Payer: MEDICARE

## 2022-09-22 NOTE — TELEPHONE ENCOUNTER
----- Message from Edison Sun MD sent at 9/22/2022  7:02 AM CDT -----  Regarding: RE: prednisone  Yes. Move her back to a dose that controls her symptoms.  ----- Message -----  From: Janie Roldan PA-C  Sent: 9/21/2022   7:29 PM CDT  To: Edison Sun MD  Subject: prednisone                                       Heevaristo MALONE-  Do you mind looking over her visit from 9/19? Shes ILD w/ +RF. Yesenia started prednisone 5 mg daily and we dropped her to 5 mg QOD. Feeling much worse with less predinsone. I messaged yesenia but havent heard back. OK to move her back to daily 5 mg?    We started cellcept as well

## 2022-09-22 NOTE — TELEPHONE ENCOUNTER
Spoke w/ patient and informed her per Janie to start back ting the prednisone 5mg and we will check to see how she is doing with it in a couple of days. Patient states that the cellcept is making her feel very tired, exhausted.

## 2022-09-22 NOTE — TELEPHONE ENCOUNTER
Please continue current dose of cellcept at this time. Will re-evaluate once she has been on increased dose of prednisone

## 2022-09-22 NOTE — TELEPHONE ENCOUNTER
Please call patient and ask her to start taking prednisone 5 mg daily. We will reach out in several days and see how she is feeling.

## 2022-09-27 NOTE — TELEPHONE ENCOUNTER
Spoke with patient and informed her per provider     To continue current dose of cellcept and we will re-evaluate her after she has been on an increased prednisone. Patient verbalized understanding and was grateful for the call-DD

## 2022-09-28 ENCOUNTER — APPOINTMENT (OUTPATIENT)
Dept: RADIOLOGY | Facility: HOSPITAL | Age: 77
End: 2022-09-28
Attending: INTERNAL MEDICINE
Payer: MEDICARE

## 2022-09-28 DIAGNOSIS — Z78.0 POSTMENOPAUSAL: ICD-10-CM

## 2022-09-28 PROCEDURE — 77080 DEXA BONE DENSITY SPINE HIP: ICD-10-PCS | Mod: 26,,, | Performed by: RADIOLOGY

## 2022-09-28 PROCEDURE — 77080 DXA BONE DENSITY AXIAL: CPT | Mod: 26,,, | Performed by: RADIOLOGY

## 2022-09-28 PROCEDURE — 77080 DXA BONE DENSITY AXIAL: CPT | Mod: TC

## 2022-09-30 ENCOUNTER — TELEPHONE (OUTPATIENT)
Dept: RHEUMATOLOGY | Facility: CLINIC | Age: 77
End: 2022-09-30
Payer: MEDICARE

## 2022-09-30 NOTE — TELEPHONE ENCOUNTER
Scheduled appt for patient to discuss dexa scan...patient was scheduled for her time and date preference

## 2022-09-30 NOTE — TELEPHONE ENCOUNTER
----- Message from Janie Roldan PA-C sent at 9/30/2022  8:02 AM CDT -----  Please make her an appt to come in to discuss DEXA scan results

## 2022-10-06 ENCOUNTER — TELEPHONE (OUTPATIENT)
Dept: RHEUMATOLOGY | Facility: CLINIC | Age: 77
End: 2022-10-06
Payer: MEDICARE

## 2022-10-06 NOTE — TELEPHONE ENCOUNTER
"Spoke w/ patient and patient states that she is doing just fine on the 5mg of prednisone but she became very irate when speaking of the mycophenolate(Cellcept). She states "that she knows her body and that she can take any medication that is on the market and that she feels worse off than before she started taking the medication. Patient states that she is having trouble swallowing the medication because of how big the pills are. Patient is not happy with taking CellCept.     -Trinh   "

## 2022-10-06 NOTE — TELEPHONE ENCOUNTER
----- Message from Janie Roldan PA-C sent at 10/5/2022  2:06 PM CDT -----  Regarding: FW: check on med  Please contact patient to see how she is doing with the 5 mg daily prednisone    ----- Message -----  From: Janie Roldan PA-C  Sent: 9/22/2022   2:27 PM CDT  To: Janie Roldan PA-C  Subject: check on med                                     See how shes feeling w/ increasing prednisone to 5 mg daily

## 2022-10-09 NOTE — TELEPHONE ENCOUNTER
She should be able to cut the tablets in half if needed- we can also see if the pharmacy has liquid form available if that would be easier. Is there any other specific problem with the cellcept other than trouble swallowing? Please let me know

## 2022-10-11 NOTE — TELEPHONE ENCOUNTER
"Spoke with patient and she states that she is not having any other problems with the cellcept except that the pill is too big. She also doesn't feel any relief from symptoms. Patient states "The only thing that keeps me going is the prednisone" Informed patient I would send a message to the provider to see what the recommendations may be.-DD  "

## 2022-10-13 NOTE — TELEPHONE ENCOUNTER
Informed patient per Janie that it does take Cellcept at least 12 weeks before medication is effective. Patient verbalized understanding and was grateful for the call.

## 2022-10-13 NOTE — TELEPHONE ENCOUNTER
Please just remind her that cellcept takes at least 12 weeks before it begins to work. We can discuss further at her appointment as well.

## 2022-11-14 ENCOUNTER — LAB VISIT (OUTPATIENT)
Dept: LAB | Facility: HOSPITAL | Age: 77
End: 2022-11-14
Attending: PHYSICIAN ASSISTANT
Payer: MEDICARE

## 2022-11-14 DIAGNOSIS — J84.9 ILD (INTERSTITIAL LUNG DISEASE): ICD-10-CM

## 2022-11-14 LAB
ALBUMIN SERPL BCP-MCNC: 3.7 G/DL (ref 3.5–5.2)
ALP SERPL-CCNC: 62 U/L (ref 55–135)
ALT SERPL W/O P-5'-P-CCNC: 9 U/L (ref 10–44)
ANION GAP SERPL CALC-SCNC: 15 MMOL/L (ref 8–16)
AST SERPL-CCNC: 14 U/L (ref 10–40)
BASOPHILS # BLD AUTO: 0.05 K/UL (ref 0–0.2)
BASOPHILS NFR BLD: 0.5 % (ref 0–1.9)
BILIRUB SERPL-MCNC: 0.4 MG/DL (ref 0.1–1)
BUN SERPL-MCNC: 36 MG/DL (ref 8–23)
CALCIUM SERPL-MCNC: 10 MG/DL (ref 8.7–10.5)
CHLORIDE SERPL-SCNC: 106 MMOL/L (ref 95–110)
CO2 SERPL-SCNC: 20 MMOL/L (ref 23–29)
CREAT SERPL-MCNC: 1.6 MG/DL (ref 0.5–1.4)
CRP SERPL-MCNC: 2.9 MG/L (ref 0–8.2)
DIFFERENTIAL METHOD: ABNORMAL
EOSINOPHIL # BLD AUTO: 0.2 K/UL (ref 0–0.5)
EOSINOPHIL NFR BLD: 2.1 % (ref 0–8)
ERYTHROCYTE [DISTWIDTH] IN BLOOD BY AUTOMATED COUNT: 12.4 % (ref 11.5–14.5)
ERYTHROCYTE [SEDIMENTATION RATE] IN BLOOD BY WESTERGREN METHOD: 47 MM/HR (ref 0–20)
EST. GFR  (NO RACE VARIABLE): 33 ML/MIN/1.73 M^2
GLUCOSE SERPL-MCNC: 98 MG/DL (ref 70–110)
HCT VFR BLD AUTO: 39.5 % (ref 37–48.5)
HGB BLD-MCNC: 12.5 G/DL (ref 12–16)
IMM GRANULOCYTES # BLD AUTO: 0.02 K/UL (ref 0–0.04)
IMM GRANULOCYTES NFR BLD AUTO: 0.2 % (ref 0–0.5)
LYMPHOCYTES # BLD AUTO: 2.9 K/UL (ref 1–4.8)
LYMPHOCYTES NFR BLD: 31.4 % (ref 18–48)
MCH RBC QN AUTO: 30.4 PG (ref 27–31)
MCHC RBC AUTO-ENTMCNC: 31.6 G/DL (ref 32–36)
MCV RBC AUTO: 96 FL (ref 82–98)
MONOCYTES # BLD AUTO: 1 K/UL (ref 0.3–1)
MONOCYTES NFR BLD: 11 % (ref 4–15)
NEUTROPHILS # BLD AUTO: 5 K/UL (ref 1.8–7.7)
NEUTROPHILS NFR BLD: 54.8 % (ref 38–73)
NRBC BLD-RTO: 0 /100 WBC
PLATELET # BLD AUTO: 244 K/UL (ref 150–450)
PMV BLD AUTO: 10 FL (ref 9.2–12.9)
POTASSIUM SERPL-SCNC: 4.1 MMOL/L (ref 3.5–5.1)
PROT SERPL-MCNC: 7.2 G/DL (ref 6–8.4)
RBC # BLD AUTO: 4.11 M/UL (ref 4–5.4)
SODIUM SERPL-SCNC: 141 MMOL/L (ref 136–145)
WBC # BLD AUTO: 9.19 K/UL (ref 3.9–12.7)

## 2022-11-14 PROCEDURE — 80053 COMPREHEN METABOLIC PANEL: CPT | Performed by: PHYSICIAN ASSISTANT

## 2022-11-14 PROCEDURE — 36415 COLL VENOUS BLD VENIPUNCTURE: CPT | Mod: PO | Performed by: PHYSICIAN ASSISTANT

## 2022-11-14 PROCEDURE — 86140 C-REACTIVE PROTEIN: CPT | Performed by: PHYSICIAN ASSISTANT

## 2022-11-14 PROCEDURE — 85025 COMPLETE CBC W/AUTO DIFF WBC: CPT | Performed by: PHYSICIAN ASSISTANT

## 2022-11-14 PROCEDURE — 85651 RBC SED RATE NONAUTOMATED: CPT | Performed by: PHYSICIAN ASSISTANT

## 2022-12-10 NOTE — TELEPHONE ENCOUNTER
I have reviewed discharge instructions with the parent. The parent verbalized understanding. The Family member wereeducated on frequent/proper hand-washing techniques, Standard precautions, avoid crowds and persons with known infections and staying current with immunizations. The Family member was given time and space to ask questions. Spoke to patient and relayed the following information from Dr. Banks:    No x-ray evidence of rheumatoid, available labs look okay.  Will send CellCept to pharmacy after all labs posted.

## 2022-12-13 ENCOUNTER — TELEPHONE (OUTPATIENT)
Dept: RHEUMATOLOGY | Facility: CLINIC | Age: 77
End: 2022-12-13
Payer: MEDICARE

## 2022-12-20 ENCOUNTER — HOSPITAL ENCOUNTER (OUTPATIENT)
Dept: RADIOLOGY | Facility: HOSPITAL | Age: 77
Discharge: HOME | End: 2022-12-20
Attending: INTERNAL MEDICINE
Payer: MEDICARE

## 2022-12-20 ENCOUNTER — CLINICAL SUPPORT (OUTPATIENT)
Dept: PULMONOLOGY | Facility: CLINIC | Age: 77
End: 2022-12-20
Payer: MEDICARE

## 2022-12-20 ENCOUNTER — OFFICE VISIT (OUTPATIENT)
Dept: PULMONOLOGY | Facility: CLINIC | Age: 77
End: 2022-12-20
Payer: MEDICARE

## 2022-12-20 VITALS
SYSTOLIC BLOOD PRESSURE: 134 MMHG | WEIGHT: 177.94 LBS | DIASTOLIC BLOOD PRESSURE: 73 MMHG | WEIGHT: 177.94 LBS | HEART RATE: 83 BPM | RESPIRATION RATE: 17 BRPM | OXYGEN SATURATION: 98 % | HEIGHT: 63 IN | BODY MASS INDEX: 31.53 KG/M2 | BODY MASS INDEX: 31.53 KG/M2 | HEIGHT: 63 IN

## 2022-12-20 DIAGNOSIS — Z71.89 MEDICATION CARE PLAN DISCUSSED WITH PATIENT: ICD-10-CM

## 2022-12-20 DIAGNOSIS — J84.9 ILD (INTERSTITIAL LUNG DISEASE): ICD-10-CM

## 2022-12-20 DIAGNOSIS — J98.4 RESTRICTIVE LUNG DISEASE: ICD-10-CM

## 2022-12-20 DIAGNOSIS — Z79.52 CURRENT CHRONIC USE OF SYSTEMIC STEROIDS: ICD-10-CM

## 2022-12-20 DIAGNOSIS — M05.10 DIFFUSE INTERSTITIAL RHEUMATOID DISEASE OF LUNG: ICD-10-CM

## 2022-12-20 DIAGNOSIS — Z23 NEED FOR IMMUNIZATION AGAINST INFLUENZA: ICD-10-CM

## 2022-12-20 DIAGNOSIS — Z79.899 ON CELLCEPT THERAPY: ICD-10-CM

## 2022-12-20 DIAGNOSIS — Z23 NEED FOR 23-POLYVALENT PNEUMOCOCCAL POLYSACCHARIDE VACCINE: Primary | ICD-10-CM

## 2022-12-20 LAB
BRPFT: ABNORMAL
DLCO ADJ PRE: 8.8 ML/(MIN*MMHG) (ref 13.61–25.07)
DLCO SINGLE BREATH LLN: 13.61
DLCO SINGLE BREATH PRE REF: 42.3 %
DLCO SINGLE BREATH REF: 19.34
DLCOC SBVA LLN: 2.59
DLCOC SBVA PRE REF: 101.9 %
DLCOC SBVA REF: 4.05
DLCOC SINGLE BREATH LLN: 13.61
DLCOC SINGLE BREATH PRE REF: 45.5 %
DLCOC SINGLE BREATH REF: 19.34
DLCOVA LLN: 2.59
DLCOVA PRE REF: 94.7 %
DLCOVA PRE: 3.84 ML/(MIN*MMHG*L) (ref 2.59–5.52)
DLCOVA REF: 4.05
DLVAADJ PRE: 4.13 ML/(MIN*MMHG*L) (ref 2.59–5.52)
FEF 25 75 LLN: 0.64
FEF 25 75 PRE REF: 104.5 %
FEF 25 75 REF: 1.53
FEV1 FVC LLN: 63
FEV1 FVC PRE REF: 102 %
FEV1 FVC REF: 77
FEV1 LLN: 1.31
FEV1 PRE REF: 82.5 %
FEV1 REF: 1.88
FVC LLN: 1.73
FVC PRE REF: 79.9 %
FVC REF: 2.46
IVC PRE: 1.39 L (ref 1.73–3.2)
IVC SINGLE BREATH LLN: 1.73
IVC SINGLE BREATH PRE REF: 56.4 %
IVC SINGLE BREATH REF: 2.46
PEF LLN: 3.08
PEF PRE REF: 101.6 %
PEF REF: 4.73
PRE DLCO: 8.17 ML/(MIN*MMHG) (ref 13.61–25.07)
PRE FEF 25 75: 1.6 L/S (ref 0.64–2.42)
PRE FET 100: 8.34 SEC
PRE FEV1 FVC: 78.74 % (ref 62.53–91.79)
PRE FEV1: 1.55 L (ref 1.31–2.44)
PRE FVC: 1.97 L (ref 1.73–3.2)
PRE PEF: 4.81 L/S (ref 3.08–6.39)
VA PRE: 2.15 L (ref 4.62–4.62)
VA SINGLE BREATH LLN: 4.62
VA SINGLE BREATH PRE REF: 46.5 %
VA SINGLE BREATH REF: 4.62

## 2022-12-20 PROCEDURE — 99999 PR PBB SHADOW E&M-EST. PATIENT-LVL IV: CPT | Mod: PBBFAC,,, | Performed by: INTERNAL MEDICINE

## 2022-12-20 PROCEDURE — 99999 PR PBB SHADOW E&M-EST. PATIENT-LVL IV: ICD-10-PCS | Mod: PBBFAC,,, | Performed by: INTERNAL MEDICINE

## 2022-12-20 PROCEDURE — 94729 PR C02/MEMBANE DIFFUSE CAPACITY: ICD-10-PCS | Mod: S$GLB,,, | Performed by: INTERNAL MEDICINE

## 2022-12-20 PROCEDURE — 94618 PULMONARY STRESS TESTING: CPT | Mod: S$GLB,,, | Performed by: INTERNAL MEDICINE

## 2022-12-20 PROCEDURE — 99214 OFFICE O/P EST MOD 30 MIN: CPT | Mod: 25,S$GLB,, | Performed by: INTERNAL MEDICINE

## 2022-12-20 PROCEDURE — G0009 PNEUMOCOCCAL POLYSACCHARIDE VACCINE 23-VALENT =>2YO SQ IM: ICD-10-PCS | Mod: S$GLB,,, | Performed by: INTERNAL MEDICINE

## 2022-12-20 PROCEDURE — 99214 PR OFFICE/OUTPT VISIT, EST, LEVL IV, 30-39 MIN: ICD-10-PCS | Mod: 25,S$GLB,, | Performed by: INTERNAL MEDICINE

## 2022-12-20 PROCEDURE — G0008 ADMIN INFLUENZA VIRUS VAC: HCPCS | Mod: S$GLB,,, | Performed by: INTERNAL MEDICINE

## 2022-12-20 PROCEDURE — 71046 X-RAY EXAM CHEST 2 VIEWS: CPT | Mod: TC

## 2022-12-20 PROCEDURE — 71046 XR CHEST PA AND LATERAL: ICD-10-PCS | Mod: 26,,, | Performed by: RADIOLOGY

## 2022-12-20 PROCEDURE — 90732 PPSV23 VACC 2 YRS+ SUBQ/IM: CPT | Mod: S$GLB,,, | Performed by: INTERNAL MEDICINE

## 2022-12-20 PROCEDURE — 94618 PULMONARY STRESS TESTING: ICD-10-PCS | Mod: S$GLB,,, | Performed by: INTERNAL MEDICINE

## 2022-12-20 PROCEDURE — 3075F PR MOST RECENT SYSTOLIC BLOOD PRESS GE 130-139MM HG: ICD-10-PCS | Mod: CPTII,S$GLB,, | Performed by: INTERNAL MEDICINE

## 2022-12-20 PROCEDURE — 3075F SYST BP GE 130 - 139MM HG: CPT | Mod: CPTII,S$GLB,, | Performed by: INTERNAL MEDICINE

## 2022-12-20 PROCEDURE — 90694 FLU VACCINE - QUADRIVALENT - ADJUVANTED: ICD-10-PCS | Mod: S$GLB,,, | Performed by: INTERNAL MEDICINE

## 2022-12-20 PROCEDURE — 94729 DIFFUSING CAPACITY: CPT | Mod: S$GLB,,, | Performed by: INTERNAL MEDICINE

## 2022-12-20 PROCEDURE — 90694 VACC AIIV4 NO PRSRV 0.5ML IM: CPT | Mod: S$GLB,,, | Performed by: INTERNAL MEDICINE

## 2022-12-20 PROCEDURE — 71046 X-RAY EXAM CHEST 2 VIEWS: CPT | Mod: 26,,, | Performed by: RADIOLOGY

## 2022-12-20 PROCEDURE — G0008 FLU VACCINE - QUADRIVALENT - ADJUVANTED: ICD-10-PCS | Mod: S$GLB,,, | Performed by: INTERNAL MEDICINE

## 2022-12-20 PROCEDURE — 94010 BREATHING CAPACITY TEST: CPT | Mod: S$GLB,,, | Performed by: INTERNAL MEDICINE

## 2022-12-20 PROCEDURE — 90732 PNEUMOCOCCAL POLYSACCHARIDE VACCINE 23-VALENT =>2YO SQ IM: ICD-10-PCS | Mod: S$GLB,,, | Performed by: INTERNAL MEDICINE

## 2022-12-20 PROCEDURE — 1101F PR PT FALLS ASSESS DOC 0-1 FALLS W/OUT INJ PAST YR: ICD-10-PCS | Mod: CPTII,S$GLB,, | Performed by: INTERNAL MEDICINE

## 2022-12-20 PROCEDURE — 3288F PR FALLS RISK ASSESSMENT DOCUMENTED: ICD-10-PCS | Mod: CPTII,S$GLB,, | Performed by: INTERNAL MEDICINE

## 2022-12-20 PROCEDURE — G0009 ADMIN PNEUMOCOCCAL VACCINE: HCPCS | Mod: S$GLB,,, | Performed by: INTERNAL MEDICINE

## 2022-12-20 PROCEDURE — 3078F PR MOST RECENT DIASTOLIC BLOOD PRESSURE < 80 MM HG: ICD-10-PCS | Mod: CPTII,S$GLB,, | Performed by: INTERNAL MEDICINE

## 2022-12-20 PROCEDURE — 1159F PR MEDICATION LIST DOCUMENTED IN MEDICAL RECORD: ICD-10-PCS | Mod: CPTII,S$GLB,, | Performed by: INTERNAL MEDICINE

## 2022-12-20 PROCEDURE — 1101F PT FALLS ASSESS-DOCD LE1/YR: CPT | Mod: CPTII,S$GLB,, | Performed by: INTERNAL MEDICINE

## 2022-12-20 PROCEDURE — 94010 BREATHING CAPACITY TEST: ICD-10-PCS | Mod: S$GLB,,, | Performed by: INTERNAL MEDICINE

## 2022-12-20 PROCEDURE — 3078F DIAST BP <80 MM HG: CPT | Mod: CPTII,S$GLB,, | Performed by: INTERNAL MEDICINE

## 2022-12-20 PROCEDURE — 3288F FALL RISK ASSESSMENT DOCD: CPT | Mod: CPTII,S$GLB,, | Performed by: INTERNAL MEDICINE

## 2022-12-20 PROCEDURE — 1159F MED LIST DOCD IN RCRD: CPT | Mod: CPTII,S$GLB,, | Performed by: INTERNAL MEDICINE

## 2022-12-20 NOTE — PROCEDURES
"[facility] 3rdFl  Six Minute Walk     SUMMARY     Ordering Provider:    Interpreting Provider:   Performing nurse/tech/RT: NADER De Leon RRT  Diagnosis:  (Restrictive Lung Disease, Diffuse Interstitial Rheumatoid Disease of the Lung, and Insterstitial Lung Disease)  Height: 5' 3" (160 cm)  Weight: 80.7 kg (177 lb 14.6 oz)  BMI (Calculated): 31.5   Patient Race:             Phase Oxygen Assessment Supplemental O2 Heart   Rate Blood Pressure Travis Dyspnea Scale Rating   Resting 98 % Room Air 77 bpm (!) 165/94   2   Exercise        Minute        1 99 % Room Air 89 bpm     2 95 % Room Air 97 bpm     3 96 % Room Air 99 bpm     4 97 % Room Air 93 bpm     5 99 % Room Air 94 bpm     6  99 % Room Air 103 bpm 129/67 4   Recovery        Minute        1 98 % Room Air 91 bpm     2 100 % Room Air 88 bpm     3 98 % Room Air 86 bpm     4 98 % Room Air 82 bpm 134/73 2     Six Minute Walk Summary  6MWT Status: completed with stops  Patient Reported: Dyspnea     Interpretation:  Did the patient stop or pause?: Yes  How many times did the patient stop or pause?: 1  Stop Time 1: 175  Restart Time 1: 246  Pause Time 1: 71 seconds                             Total Time Walked (Calculated): 289 seconds  Final Partial Lap Distance (feet): 0 feet  Total Distance Meters (Calculated): 243.84 meters  Predicted Distance Meters (Calculated): 363.18 meters  Percentage of Predicted (Calculated): 67.14  Peak VO2 (Calculated): 11.3  Mets: 3.23  Has The Patient Had a Previous Six Minute Walk Test?: Yes       Previous 6MWT Results  Has The Patient Had a Previous Six Minute Walk Test?: Yes  Date of Previous Test: 11/04/21  Total Time Walked: 118 seconds  Total Distance (meters): 60.96  Predicted Distance (meters): 402.95 meters  Percentage of Predicted: 15.13  Percent Change (Calculated): -3    "

## 2022-12-20 NOTE — PROGRESS NOTES
Pulmonary Outpatient Follow Up Visit     Subjective:       Patient ID: Jace Dean is a 77 y.o. female.    Chief Complaint: Interstitial Lung Disease      HPI        77 y o f pt presenting for 6 months follow-up    12/20/2022 on prednisone 5 mg everyday and CellCept 1000 mg twice daily.  Was feeling better on prednisone 10 mg.  Osteoporosis noted on bone scan.  Feeling SOB on exertion.  Input with rheumatology PA September 2022 noted.          Initially evaluated 10/2021 for worsening chronic dry cough and shortness of breath at rest and on exertion over > 1 YR .     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       Denies previous history of asthma.          She has seen Dr. King  in the past.       Diagnosed with interstitial lung disease with autoimmune features likely related to rheumatoid arthritis elevated rheumatoid factor 150, CellCept suggested by Dr. Banks Rheumatology but the patient was concerned about side effects i.e. cancer due the fact that she has family history of ovarian and breast cancers.    Complains of claudication of her lower extremity at rest and on exertion.  Arterial lower extremity ultrasound unremarkable.    I started on prednisone 10 mg April 2022 and she did feel a drastic improvement in her shortness of breath.  Spirometry with DLCO post prednisone + significant DLCO improvement.    Review of Systems   Constitutional:  Positive for fatigue. Negative for fever and chills.   HENT:  Negative for nosebleeds.    Eyes:  Negative for redness.   Respiratory:  Positive for cough, shortness of breath, dyspnea on extertion and use of rescue inhaler. Negative for choking.    Cardiovascular:  Positive for chest pain.        Bilateral lower extremity claudication   Genitourinary:  Negative for hematuria.   Endocrine:  Negative for cold intolerance.    Musculoskeletal:  Positive for  "arthralgias.   Skin:  Negative for rash.   Gastrointestinal:  Negative for vomiting.   Neurological:  Negative for syncope.   Hematological:  Negative for adenopathy.   Psychiatric/Behavioral:  Negative for confusion.      Outpatient Encounter Medications as of 12/20/2022   Medication Sig Dispense Refill    albuterol (PROVENTIL) 2.5 mg /3 mL (0.083 %) nebulizer solution Take 2.5 mg by nebulization every 6 (six) hours as needed.      albuterol sulfate (PROAIR RESPICLICK) 90 mcg/actuation inhaler Inhale into the lungs 4 (four) times daily as needed.      calcium carbonate (CALCIUM 500 ORAL) Take by mouth.      mv-mn/iron/folic acid/herb 190 (VITAMIN D3 COMPLETE ORAL) Take by mouth.      mycophenolate (CELLCEPT) 500 mg Tab TAKE 2 TABLETS BY MOUTH 2 (TWO) TIMES DAILY. 120 tablet 1    predniSONE (DELTASONE) 5 MG tablet Take 1 tablet (5 mg total) by mouth once daily. 90 tablet 2    simvastatin (ZOCOR) 40 MG tablet Take 40 mg by mouth every evening.      furosemide (LASIX) 20 MG tablet Take 1 tablet (20 mg total) by mouth daily as needed (edema). (Patient not taking: Reported on 12/20/2022) 90 tablet 0     No facility-administered encounter medications on file as of 12/20/2022.       Objective:     Vital Signs (Most Recent)  Vital Signs  Pulse: 83  Resp: 17  SpO2: 98 %  BP: 134/73  Height and Weight  Height: 5' 3" (160 cm)  Weight: 80.7 kg (177 lb 14.6 oz)  BSA (Calculated - sq m): 1.89 sq meters  BMI (Calculated): 31.5  Weight in (lb) to have BMI = 25: 140.8]  Wt Readings from Last 2 Encounters:   12/20/22 80.7 kg (177 lb 14.6 oz)   12/20/22 80.7 kg (177 lb 14.6 oz)       Physical Exam   Constitutional: She is oriented to person, place, and time. She appears well-developed and well-nourished. No distress.   HENT:   Head: Normocephalic.   Cardiovascular: Normal rate and regular rhythm.   Pulmonary/Chest: Normal expansion and effort normal. No stridor. No respiratory distress. She has rales. She exhibits no tenderness. "   Bilateral crackles ++ lower fields    Abdominal: She exhibits no distension.   Musculoskeletal:         General: No tenderness.      Cervical back: Neck supple.   Lymphadenopathy:     She has no cervical adenopathy.   Neurological: She is alert and oriented to person, place, and time. Gait normal.   Skin: Skin is warm. There is cyanosis. Nails show no clubbing.   Psychiatric: She has a normal mood and affect. Her behavior is normal. Judgment and thought content normal.   Nursing note and vitals reviewed.    Laboratory  Lab Results   Component Value Date    WBC 6.62 12/20/2022    RBC 3.74 (L) 12/20/2022    HGB 11.3 (L) 12/20/2022    HCT 36.2 (L) 12/20/2022    MCV 97 12/20/2022    MCH 30.2 12/20/2022    MCHC 31.2 (L) 12/20/2022    RDW 13.1 12/20/2022     12/20/2022    MPV 9.4 12/20/2022    GRAN 4.1 12/20/2022    GRAN 61.9 12/20/2022    LYMPH 1.5 12/20/2022    LYMPH 22.4 12/20/2022    MONO 0.8 12/20/2022    MONO 11.9 12/20/2022    EOS 0.2 12/20/2022    BASO 0.06 12/20/2022    EOSINOPHIL 2.6 12/20/2022    BASOPHIL 0.9 12/20/2022       BMP  Lab Results   Component Value Date     12/20/2022    K 4.0 12/20/2022     12/20/2022    CO2 25 12/20/2022    BUN 30 (H) 12/20/2022    CREATININE 1.4 12/20/2022    CALCIUM 9.4 12/20/2022    ANIONGAP 12 12/20/2022    ESTGFRAFRICA 42 (A) 01/06/2022    EGFRNONAA 36 (A) 01/06/2022    AST 15 12/20/2022    ALT 14 12/20/2022    PROT 6.3 12/20/2022       Lab Results   Component Value Date    BNP 44 10/27/2021       No results found for: TSH    Lab Results   Component Value Date    SEDRATE 47 (H) 11/14/2022       Lab Results   Component Value Date    CRP 2.4 12/20/2022     Lab Results   Component Value Date     (H) 10/27/2021        No results found for: ASPERGILLUS  No results found for: AFUMIGATUSCL     Lab Results   Component Value Date    ACE 27 12/14/2021        Diagnostic Results:  I have personally reviewed today the following studies:    Chest x-ray  12/20/2022    CLINICAL HISTORY:  Interstitial pulmonary disease, unspecified     TECHNIQUE:  PA and lateral views of the chest were performed.     COMPARISON:  10/27/2021     FINDINGS:  The cardiomediastinal silhouette is with normal limits.  The lungs are well expanded.  There are moderate basilar predominant reticular changes without significant change.  There is no consolidation or pleural effusion.  There is a mild chronic compression fracture of what is likely T12.     Impression:     Moderate features of an underlying chronic interstitial lung process.  No significant change or evidence for superimposed acute process.         CT CHEST WITHOUT CONTRAST December 2021     CLINICAL HISTORY:  restrictive lung dz; Interstitial pulmonary disease, unspecified     TECHNIQUE:  Low dose axial images, sagittal and coronal reformations were obtained from the thoracic inlet to the lung bases. Contrast was not administered.     COMPARISON:  None     FINDINGS:  Base of Neck: No significant abnormality.     Thoracic soft tissues: Normal.     Aorta: Left-sided aortic arch.  Mild atherosclerosis.  No aneurysm.     Heart: Normal size.  Minimal fluid in the pericardial recesses.     Pulmonary vasculature: Pulmonary arteries distribute normally.  There are four pulmonary veins.     Daysi/Mediastinum: Multiple subcentimeter in short axis mediastinal lymph nodes.  No pathologic mercedez enlargement.     Airways: Patent.     Lungs/Pleura: Bilateral peripheral and basilar distribution of subpleural reticulation, most pronounced in the lower lobes and lingula with associated honeycombing.  No suspicious pulmonary nodules/masses.  Scattered low-grade ground-glass opacities also noted.  No consolidation or pleural effusion.     Esophagus: Normal.     Upper Abdomen: No abnormality of the partially imaged upper abdomen.     Bones: No acute fracture. No suspicious lytic or sclerotic lesions.     Impression:     Bilateral interstitial lung  disease with appearance typical for UIP.                  Nuc stress 11/29/2021      Normal myocardial perfusion scan. There is no evidence of myocardial ischemia or infarction.    The gated perfusion images showed an ejection fraction of 88% at rest. The gated perfusion images showed an ejection fraction of 86% post stress.    The EKG portion of this study is negative for ischemia.    There were no arrhythmias during stress.       Echo 10/2021      Summary    Concentric hypertrophy and normal systolic function.  The ascending aorta is mildly dilated.  Mild tricuspid regurgitation.  The estimated ejection fraction is 60%.  Normal left ventricular diastolic function.  With normal right ventricular systolic function.  Normal central venous pressure (3 mmHg).  The estimated PA systolic pressure is 26 mmHg.      Six min 11/2021    Interpretation:   Total distance walked in six minutes is   severelyreduced and therefore  the overall  functional capacity is   severely reduced. There was no significant exercise induced hypoxemia.       PFT 2021    Grade A-D -acceptable quality of tracingsModerate restriction based on reduced Forced Vital Capacity (FVC). Consider lung volumes if clinically indicated.Mild restriction. (TLC< LLN and > or equal to 70% of predicted).Diffusion capacity is severely   reduced - unadjusted for hemoglobin (DLCO < 40% predicted).Maximum voluntary ventilation is moderately reduced. (MVV% predicted is 51% to 65% of predicted)Flow volume loops demonstrate a restrictive defect.Overall there is no significant ventilatory   impairment. (FEV1 >LLN)Flow volume loops demonstrate a restrictive defect.Pattern of restriction and decreased diffusion suggest interstitial pulmonary fibrosis. Clinical correlation suggested.       Spirometry 06/08/2022    Mild restriction with moderately severe DLCO reduction however DLCO have a fluid from 33% to 45%      Spirometry 12/20/2022    IMPROVED FVC.  STABLE  DLCO    Assessment/Plan:   Need for 23-polyvalent pneumococcal polysaccharide vaccine  -     (In Office Administered) Pneumococcal Polysaccharide Vaccine (23 Valent) (SQ/IM)    Need for immunization against influenza  -     Influenza - Quadrivalent (Adjuvanted)    Diffuse interstitial rheumatoid disease of lung  -     Spirometry with/without bronchodilator & DLCO; Future; Expected date: 06/20/2023  -     Stress test, pulmonary; Future; Expected date: 06/20/2023  -     X-Ray Chest PA And Lateral; Future; Expected date: 06/20/2023    On Cellcept therapy  -     (In Office Administered) Pneumococcal Polysaccharide Vaccine (23 Valent) (SQ/IM)    Current chronic use of systemic steroids  -     (In Office Administered) Pneumococcal Polysaccharide Vaccine (23 Valent) (SQ/IM)     Advised patient try prednisone 5 mg every other day.  Hesitant to proceed.      Continue CellCept 1000 mg twice daily.  Might need 1500 mg twice daily.  Follow-up with rheumatology.  Safety labs for CellCept.      Repeat chest x-ray spirometry and DLCO and 6 minute walking test in 6 months.        Influenza vaccination and PPSV 23    Up-to-date on Prevnar 13  Follow up in about 6 months (around 6/20/2023).    This note was prepared using voice recognition system and is likely to have sound alike errors that may have been overlooked even after proof reading.  Please call me with any questions    Discussed diagnosis, its evaluation, treatment and usual course. All questions answered.      Emmanuel Patterson MD

## 2023-01-12 ENCOUNTER — LAB VISIT (OUTPATIENT)
Dept: LAB | Facility: HOSPITAL | Age: 78
End: 2023-01-12
Payer: MEDICARE

## 2023-01-12 DIAGNOSIS — J84.9 ILD (INTERSTITIAL LUNG DISEASE): ICD-10-CM

## 2023-01-12 LAB
ALBUMIN SERPL BCP-MCNC: 3.3 G/DL (ref 3.5–5.2)
ALP SERPL-CCNC: 55 U/L (ref 55–135)
ALT SERPL W/O P-5'-P-CCNC: 9 U/L (ref 10–44)
ANION GAP SERPL CALC-SCNC: 11 MMOL/L (ref 8–16)
AST SERPL-CCNC: 23 U/L (ref 10–40)
BASOPHILS # BLD AUTO: 0.06 K/UL (ref 0–0.2)
BASOPHILS NFR BLD: 1.1 % (ref 0–1.9)
BILIRUB SERPL-MCNC: 0.5 MG/DL (ref 0.1–1)
BUN SERPL-MCNC: 20 MG/DL (ref 8–23)
CALCIUM SERPL-MCNC: 9.8 MG/DL (ref 8.7–10.5)
CHLORIDE SERPL-SCNC: 108 MMOL/L (ref 95–110)
CO2 SERPL-SCNC: 23 MMOL/L (ref 23–29)
CREAT SERPL-MCNC: 1.4 MG/DL (ref 0.5–1.4)
CRP SERPL-MCNC: 3.2 MG/L (ref 0–8.2)
DIFFERENTIAL METHOD: ABNORMAL
EOSINOPHIL # BLD AUTO: 0.2 K/UL (ref 0–0.5)
EOSINOPHIL NFR BLD: 3.2 % (ref 0–8)
ERYTHROCYTE [DISTWIDTH] IN BLOOD BY AUTOMATED COUNT: 13 % (ref 11.5–14.5)
ERYTHROCYTE [SEDIMENTATION RATE] IN BLOOD BY WESTERGREN METHOD: 45 MM/HR (ref 0–20)
EST. GFR  (NO RACE VARIABLE): 38 ML/MIN/1.73 M^2
GLUCOSE SERPL-MCNC: 105 MG/DL (ref 70–110)
HCT VFR BLD AUTO: 37.7 % (ref 37–48.5)
HGB BLD-MCNC: 11.6 G/DL (ref 12–16)
IMM GRANULOCYTES # BLD AUTO: 0.02 K/UL (ref 0–0.04)
IMM GRANULOCYTES NFR BLD AUTO: 0.4 % (ref 0–0.5)
LYMPHOCYTES # BLD AUTO: 1.9 K/UL (ref 1–4.8)
LYMPHOCYTES NFR BLD: 34.6 % (ref 18–48)
MCH RBC QN AUTO: 29.6 PG (ref 27–31)
MCHC RBC AUTO-ENTMCNC: 30.8 G/DL (ref 32–36)
MCV RBC AUTO: 96 FL (ref 82–98)
MONOCYTES # BLD AUTO: 0.5 K/UL (ref 0.3–1)
MONOCYTES NFR BLD: 10.1 % (ref 4–15)
NEUTROPHILS # BLD AUTO: 2.7 K/UL (ref 1.8–7.7)
NEUTROPHILS NFR BLD: 50.6 % (ref 38–73)
NRBC BLD-RTO: 0 /100 WBC
PLATELET # BLD AUTO: 226 K/UL (ref 150–450)
PMV BLD AUTO: 10.4 FL (ref 9.2–12.9)
POTASSIUM SERPL-SCNC: 3.9 MMOL/L (ref 3.5–5.1)
PROT SERPL-MCNC: 6.7 G/DL (ref 6–8.4)
RBC # BLD AUTO: 3.92 M/UL (ref 4–5.4)
SODIUM SERPL-SCNC: 142 MMOL/L (ref 136–145)
WBC # BLD AUTO: 5.34 K/UL (ref 3.9–12.7)

## 2023-01-12 PROCEDURE — 85025 COMPLETE CBC W/AUTO DIFF WBC: CPT | Performed by: PHYSICIAN ASSISTANT

## 2023-01-12 PROCEDURE — 85651 RBC SED RATE NONAUTOMATED: CPT | Performed by: PHYSICIAN ASSISTANT

## 2023-01-12 PROCEDURE — 80053 COMPREHEN METABOLIC PANEL: CPT | Performed by: PHYSICIAN ASSISTANT

## 2023-01-12 PROCEDURE — 36415 COLL VENOUS BLD VENIPUNCTURE: CPT | Mod: PO | Performed by: PHYSICIAN ASSISTANT

## 2023-01-12 PROCEDURE — 86140 C-REACTIVE PROTEIN: CPT | Performed by: PHYSICIAN ASSISTANT

## 2023-03-20 ENCOUNTER — LAB VISIT (OUTPATIENT)
Dept: LAB | Facility: HOSPITAL | Age: 78
End: 2023-03-20
Attending: PHYSICIAN ASSISTANT
Payer: MEDICARE

## 2023-03-20 DIAGNOSIS — J84.9 ILD (INTERSTITIAL LUNG DISEASE): ICD-10-CM

## 2023-03-20 LAB
ALBUMIN SERPL BCP-MCNC: 3.6 G/DL (ref 3.5–5.2)
ALP SERPL-CCNC: 55 U/L (ref 55–135)
ALT SERPL W/O P-5'-P-CCNC: 11 U/L (ref 10–44)
ANION GAP SERPL CALC-SCNC: 10 MMOL/L (ref 8–16)
AST SERPL-CCNC: 17 U/L (ref 10–40)
BASOPHILS # BLD AUTO: 0.04 K/UL (ref 0–0.2)
BASOPHILS NFR BLD: 0.8 % (ref 0–1.9)
BILIRUB SERPL-MCNC: 0.3 MG/DL (ref 0.1–1)
BUN SERPL-MCNC: 27 MG/DL (ref 8–23)
CALCIUM SERPL-MCNC: 9.2 MG/DL (ref 8.7–10.5)
CHLORIDE SERPL-SCNC: 107 MMOL/L (ref 95–110)
CO2 SERPL-SCNC: 22 MMOL/L (ref 23–29)
CREAT SERPL-MCNC: 1.2 MG/DL (ref 0.5–1.4)
CRP SERPL-MCNC: 2.2 MG/L (ref 0–8.2)
DIFFERENTIAL METHOD: ABNORMAL
EOSINOPHIL # BLD AUTO: 0.3 K/UL (ref 0–0.5)
EOSINOPHIL NFR BLD: 6.3 % (ref 0–8)
ERYTHROCYTE [DISTWIDTH] IN BLOOD BY AUTOMATED COUNT: 12.4 % (ref 11.5–14.5)
ERYTHROCYTE [SEDIMENTATION RATE] IN BLOOD BY WESTERGREN METHOD: 43 MM/HR (ref 0–20)
EST. GFR  (NO RACE VARIABLE): 46 ML/MIN/1.73 M^2
GLUCOSE SERPL-MCNC: 89 MG/DL (ref 70–110)
HCT VFR BLD AUTO: 37.1 % (ref 37–48.5)
HGB BLD-MCNC: 11.4 G/DL (ref 12–16)
IMM GRANULOCYTES # BLD AUTO: 0.01 K/UL (ref 0–0.04)
IMM GRANULOCYTES NFR BLD AUTO: 0.2 % (ref 0–0.5)
LYMPHOCYTES # BLD AUTO: 2.1 K/UL (ref 1–4.8)
LYMPHOCYTES NFR BLD: 41.7 % (ref 18–48)
MCH RBC QN AUTO: 28.9 PG (ref 27–31)
MCHC RBC AUTO-ENTMCNC: 30.7 G/DL (ref 32–36)
MCV RBC AUTO: 94 FL (ref 82–98)
MONOCYTES # BLD AUTO: 0.6 K/UL (ref 0.3–1)
MONOCYTES NFR BLD: 12.3 % (ref 4–15)
NEUTROPHILS # BLD AUTO: 1.9 K/UL (ref 1.8–7.7)
NEUTROPHILS NFR BLD: 38.7 % (ref 38–73)
NRBC BLD-RTO: 0 /100 WBC
PLATELET # BLD AUTO: 207 K/UL (ref 150–450)
PMV BLD AUTO: 10.1 FL (ref 9.2–12.9)
POTASSIUM SERPL-SCNC: 4.3 MMOL/L (ref 3.5–5.1)
PROT SERPL-MCNC: 7 G/DL (ref 6–8.4)
RBC # BLD AUTO: 3.95 M/UL (ref 4–5.4)
SODIUM SERPL-SCNC: 139 MMOL/L (ref 136–145)
WBC # BLD AUTO: 4.94 K/UL (ref 3.9–12.7)

## 2023-03-20 PROCEDURE — 86140 C-REACTIVE PROTEIN: CPT | Performed by: PHYSICIAN ASSISTANT

## 2023-03-20 PROCEDURE — 85651 RBC SED RATE NONAUTOMATED: CPT | Performed by: PHYSICIAN ASSISTANT

## 2023-03-20 PROCEDURE — 80053 COMPREHEN METABOLIC PANEL: CPT | Performed by: PHYSICIAN ASSISTANT

## 2023-03-20 PROCEDURE — 36415 COLL VENOUS BLD VENIPUNCTURE: CPT | Mod: PO | Performed by: PHYSICIAN ASSISTANT

## 2023-03-20 PROCEDURE — 85025 COMPLETE CBC W/AUTO DIFF WBC: CPT | Performed by: PHYSICIAN ASSISTANT

## 2023-04-03 ENCOUNTER — OFFICE VISIT (OUTPATIENT)
Dept: RHEUMATOLOGY | Facility: CLINIC | Age: 78
End: 2023-04-03
Payer: MEDICARE

## 2023-04-03 VITALS
HEIGHT: 63 IN | BODY MASS INDEX: 30.86 KG/M2 | WEIGHT: 174.19 LBS | HEART RATE: 77 BPM | SYSTOLIC BLOOD PRESSURE: 130 MMHG | DIASTOLIC BLOOD PRESSURE: 81 MMHG

## 2023-04-03 DIAGNOSIS — Z51.81 ENCOUNTER FOR MEDICATION MONITORING: ICD-10-CM

## 2023-04-03 DIAGNOSIS — J84.9 ILD (INTERSTITIAL LUNG DISEASE): Primary | ICD-10-CM

## 2023-04-03 PROCEDURE — 1159F MED LIST DOCD IN RCRD: CPT | Mod: CPTII,S$GLB,, | Performed by: STUDENT IN AN ORGANIZED HEALTH CARE EDUCATION/TRAINING PROGRAM

## 2023-04-03 PROCEDURE — 99214 PR OFFICE/OUTPT VISIT, EST, LEVL IV, 30-39 MIN: ICD-10-PCS | Mod: S$GLB,,, | Performed by: STUDENT IN AN ORGANIZED HEALTH CARE EDUCATION/TRAINING PROGRAM

## 2023-04-03 PROCEDURE — 1101F PR PT FALLS ASSESS DOC 0-1 FALLS W/OUT INJ PAST YR: ICD-10-PCS | Mod: CPTII,S$GLB,, | Performed by: STUDENT IN AN ORGANIZED HEALTH CARE EDUCATION/TRAINING PROGRAM

## 2023-04-03 PROCEDURE — 99999 PR PBB SHADOW E&M-EST. PATIENT-LVL III: ICD-10-PCS | Mod: PBBFAC,,, | Performed by: STUDENT IN AN ORGANIZED HEALTH CARE EDUCATION/TRAINING PROGRAM

## 2023-04-03 PROCEDURE — 1159F PR MEDICATION LIST DOCUMENTED IN MEDICAL RECORD: ICD-10-PCS | Mod: CPTII,S$GLB,, | Performed by: STUDENT IN AN ORGANIZED HEALTH CARE EDUCATION/TRAINING PROGRAM

## 2023-04-03 PROCEDURE — 3075F SYST BP GE 130 - 139MM HG: CPT | Mod: CPTII,S$GLB,, | Performed by: STUDENT IN AN ORGANIZED HEALTH CARE EDUCATION/TRAINING PROGRAM

## 2023-04-03 PROCEDURE — 3079F PR MOST RECENT DIASTOLIC BLOOD PRESSURE 80-89 MM HG: ICD-10-PCS | Mod: CPTII,S$GLB,, | Performed by: STUDENT IN AN ORGANIZED HEALTH CARE EDUCATION/TRAINING PROGRAM

## 2023-04-03 PROCEDURE — 99214 OFFICE O/P EST MOD 30 MIN: CPT | Mod: S$GLB,,, | Performed by: STUDENT IN AN ORGANIZED HEALTH CARE EDUCATION/TRAINING PROGRAM

## 2023-04-03 PROCEDURE — 1160F PR REVIEW ALL MEDS BY PRESCRIBER/CLIN PHARMACIST DOCUMENTED: ICD-10-PCS | Mod: CPTII,S$GLB,, | Performed by: STUDENT IN AN ORGANIZED HEALTH CARE EDUCATION/TRAINING PROGRAM

## 2023-04-03 PROCEDURE — 3288F FALL RISK ASSESSMENT DOCD: CPT | Mod: CPTII,S$GLB,, | Performed by: STUDENT IN AN ORGANIZED HEALTH CARE EDUCATION/TRAINING PROGRAM

## 2023-04-03 PROCEDURE — 99999 PR PBB SHADOW E&M-EST. PATIENT-LVL III: CPT | Mod: PBBFAC,,, | Performed by: STUDENT IN AN ORGANIZED HEALTH CARE EDUCATION/TRAINING PROGRAM

## 2023-04-03 PROCEDURE — 3075F PR MOST RECENT SYSTOLIC BLOOD PRESS GE 130-139MM HG: ICD-10-PCS | Mod: CPTII,S$GLB,, | Performed by: STUDENT IN AN ORGANIZED HEALTH CARE EDUCATION/TRAINING PROGRAM

## 2023-04-03 PROCEDURE — 1126F AMNT PAIN NOTED NONE PRSNT: CPT | Mod: CPTII,S$GLB,, | Performed by: STUDENT IN AN ORGANIZED HEALTH CARE EDUCATION/TRAINING PROGRAM

## 2023-04-03 PROCEDURE — 1160F RVW MEDS BY RX/DR IN RCRD: CPT | Mod: CPTII,S$GLB,, | Performed by: STUDENT IN AN ORGANIZED HEALTH CARE EDUCATION/TRAINING PROGRAM

## 2023-04-03 PROCEDURE — 3079F DIAST BP 80-89 MM HG: CPT | Mod: CPTII,S$GLB,, | Performed by: STUDENT IN AN ORGANIZED HEALTH CARE EDUCATION/TRAINING PROGRAM

## 2023-04-03 PROCEDURE — 1101F PT FALLS ASSESS-DOCD LE1/YR: CPT | Mod: CPTII,S$GLB,, | Performed by: STUDENT IN AN ORGANIZED HEALTH CARE EDUCATION/TRAINING PROGRAM

## 2023-04-03 PROCEDURE — 1126F PR PAIN SEVERITY QUANTIFIED, NO PAIN PRESENT: ICD-10-PCS | Mod: CPTII,S$GLB,, | Performed by: STUDENT IN AN ORGANIZED HEALTH CARE EDUCATION/TRAINING PROGRAM

## 2023-04-03 PROCEDURE — 3288F PR FALLS RISK ASSESSMENT DOCUMENTED: ICD-10-PCS | Mod: CPTII,S$GLB,, | Performed by: STUDENT IN AN ORGANIZED HEALTH CARE EDUCATION/TRAINING PROGRAM

## 2023-04-03 RX ORDER — MYCOPHENOLATE MOFETIL 200 MG/ML
1000 POWDER, FOR SUSPENSION ORAL 2 TIMES DAILY
Qty: 300 ML | Refills: 11 | Status: SHIPPED | OUTPATIENT
Start: 2023-04-03 | End: 2023-07-03

## 2023-04-03 NOTE — PROGRESS NOTES
RHEUMATOLOGY CLINIC established patient VISIT    Reason for consult:- follow-up for ILD    Chief complaints, HPI, ROS, EXAM, Assessment & Plans:-    Jace Dean is a 77 y.o. pleasant female who presents to follow-up for ILD.  She was previously seen by Janie Roldan in our department on September 19, 2022.  She continues to follow closely with pulmonology Dr. Patterson.  Her ILD is presumed to be related to RA as she has positive rheumatoid factor and UIP pattern.  No active evidence of joint disease though.  She has been compliant with CellCept 2 g per day.  She has been taking this all at once.  She does state that she has a lot of trouble taking the large tablets.  Her most recent PFTs showed some improvement from previous.  She is no longer on steroids.  She is not dependent on home oxygen.  She does still complain of shortness of breath with exertion.  She was on Fosamax at some time for her bone density but was told that she had evidence of osteonecrosis of the jaw and was advised to discontinue by her dentist.  She has continued to take calcium and vitamin-D.  Rheumatologic review of systems is negative.No evidence of synovitis, dactylitis or enthesitis.    Reviewed all available old and outside pertinent medical records.    All lab results personally reviewed and interpreted by me.    1. ILD (interstitial lung disease)    2. Encounter for medication monitoring        Problem List Items Addressed This Visit          Pulmonary    ILD (interstitial lung disease) - Primary    Relevant Medications    mycophenolate mofetil (CELLCEPT) 200 mg/mL SusR    Other Relevant Orders    CBC Auto Differential    Comprehensive Metabolic Panel    C-Reactive Protein    Sedimentation rate       Other    Encounter for medication monitoring    Relevant Orders    CBC Auto Differential    Comprehensive Metabolic Panel    C-Reactive Protein    Sedimentation rate       Patient following up for ILD likely associated with RA in  light of positive rheumatoid factor  She is currently on CellCept 2 g daily but does have trouble tolerating tablets  Will switch her to oral suspension of CellCept 2 g daily and encouraged to divide dose between in the morning in the evening  She is currently off of prednisone  She had some improvement in her PFTs from June to December  Ensure taking at least Calcium 1200mg daily (dietary sources preferred) and Vitamin D3 5000 IU daily combined with Vitamin K2 100 mcg      # Follow up in about 3 months (around 7/3/2023).    Chronic comorbid conditions affecting medical decision making today:    Past Medical History:   Diagnosis Date    Hypertension        History reviewed. No pertinent surgical history.     Social History     Tobacco Use    Smoking status: Never    Smokeless tobacco: Never   Substance Use Topics    Alcohol use: No     Comment: OCC       Family History   Problem Relation Age of Onset    Cataracts Father     Cataracts Mother        Review of patient's allergies indicates:   Allergen Reactions    Meperidine     Penicillins     Tetracyclines        Medication List with Changes/Refills   New Medications    MYCOPHENOLATE MOFETIL (CELLCEPT) 200 MG/ML SUSR    Take 5 mLs (1,000 mg total) by mouth 2 (two) times a day.   Current Medications    ALBUTEROL (PROVENTIL) 2.5 MG /3 ML (0.083 %) NEBULIZER SOLUTION    Take 2.5 mg by nebulization every 6 (six) hours as needed.    ALBUTEROL SULFATE (PROAIR RESPICLICK) 90 MCG/ACTUATION INHALER    Inhale into the lungs 4 (four) times daily as needed.    CALCIUM CARBONATE (CALCIUM 500 ORAL)    Take by mouth.    FUROSEMIDE (LASIX) 20 MG TABLET    Take 1 tablet (20 mg total) by mouth daily as needed (edema).    MV-MN/IRON/FOLIC ACID/HERB 190 (VITAMIN D3 COMPLETE ORAL)    Take by mouth.    PREDNISONE (DELTASONE) 5 MG TABLET    Take 1 tablet (5 mg total) by mouth once daily.    SIMVASTATIN (ZOCOR) 40 MG TABLET    Take 40 mg by mouth every evening.   Discontinued Medications     MYCOPHENOLATE (CELLCEPT) 500 MG TAB    TAKE 2 TABLETS BY MOUTH 2 (TWO) TIMES DAILY.         Disclaimer: This note was prepared using voice recognition system and is likely to have sound alike errors and is not proofread.  Please message me with any questions.    31 minutes of total time spent on the encounter, which includes face to face time and non-face to face time preparing to see the patient (eg, review of tests), Obtaining and/or reviewing separately obtained history, Documenting clinical information in the electronic or other health record, Independently interpreting results (not separately reported) and communicating results to the patient/family/caregiver, or Care coordination (not separately reported).     Thank you for allowing me to participate in the care of Jace Dean.    Cal Ngo MD

## 2023-06-26 ENCOUNTER — LAB VISIT (OUTPATIENT)
Dept: LAB | Facility: HOSPITAL | Age: 78
End: 2023-06-26
Attending: FAMILY MEDICINE
Payer: MEDICARE

## 2023-06-26 DIAGNOSIS — J84.9 ILD (INTERSTITIAL LUNG DISEASE): ICD-10-CM

## 2023-06-26 DIAGNOSIS — Z51.81 ENCOUNTER FOR MEDICATION MONITORING: ICD-10-CM

## 2023-06-26 LAB
ALBUMIN SERPL BCP-MCNC: 3.8 G/DL (ref 3.5–5.2)
ALP SERPL-CCNC: 78 U/L (ref 55–135)
ALT SERPL W/O P-5'-P-CCNC: 44 U/L (ref 10–44)
ANION GAP SERPL CALC-SCNC: 12 MMOL/L (ref 8–16)
AST SERPL-CCNC: 17 U/L (ref 10–40)
BASOPHILS # BLD AUTO: 0.05 K/UL (ref 0–0.2)
BASOPHILS NFR BLD: 1 % (ref 0–1.9)
BILIRUB SERPL-MCNC: 0.4 MG/DL (ref 0.1–1)
BUN SERPL-MCNC: 25 MG/DL (ref 8–23)
CALCIUM SERPL-MCNC: 9.9 MG/DL (ref 8.7–10.5)
CHLORIDE SERPL-SCNC: 106 MMOL/L (ref 95–110)
CO2 SERPL-SCNC: 23 MMOL/L (ref 23–29)
CREAT SERPL-MCNC: 1.3 MG/DL (ref 0.5–1.4)
CRP SERPL-MCNC: 2.6 MG/L (ref 0–8.2)
DIFFERENTIAL METHOD: ABNORMAL
EOSINOPHIL # BLD AUTO: 0.3 K/UL (ref 0–0.5)
EOSINOPHIL NFR BLD: 6.9 % (ref 0–8)
ERYTHROCYTE [DISTWIDTH] IN BLOOD BY AUTOMATED COUNT: 13.1 % (ref 11.5–14.5)
ERYTHROCYTE [SEDIMENTATION RATE] IN BLOOD BY PHOTOMETRIC METHOD: 63 MM/HR (ref 0–36)
EST. GFR  (NO RACE VARIABLE): 41.6 ML/MIN/1.73 M^2
GLUCOSE SERPL-MCNC: 100 MG/DL (ref 70–110)
HCT VFR BLD AUTO: 40.5 % (ref 37–48.5)
HGB BLD-MCNC: 12.5 G/DL (ref 12–16)
IMM GRANULOCYTES # BLD AUTO: 0.02 K/UL (ref 0–0.04)
IMM GRANULOCYTES NFR BLD AUTO: 0.4 % (ref 0–0.5)
LYMPHOCYTES # BLD AUTO: 2.1 K/UL (ref 1–4.8)
LYMPHOCYTES NFR BLD: 42.6 % (ref 18–48)
MCH RBC QN AUTO: 29.2 PG (ref 27–31)
MCHC RBC AUTO-ENTMCNC: 30.9 G/DL (ref 32–36)
MCV RBC AUTO: 95 FL (ref 82–98)
MONOCYTES # BLD AUTO: 0.6 K/UL (ref 0.3–1)
MONOCYTES NFR BLD: 11.3 % (ref 4–15)
NEUTROPHILS # BLD AUTO: 1.9 K/UL (ref 1.8–7.7)
NEUTROPHILS NFR BLD: 37.8 % (ref 38–73)
NRBC BLD-RTO: 0 /100 WBC
PLATELET # BLD AUTO: 215 K/UL (ref 150–450)
PMV BLD AUTO: 10.5 FL (ref 9.2–12.9)
POTASSIUM SERPL-SCNC: 4.2 MMOL/L (ref 3.5–5.1)
PROT SERPL-MCNC: 6.9 G/DL (ref 6–8.4)
RBC # BLD AUTO: 4.28 M/UL (ref 4–5.4)
SODIUM SERPL-SCNC: 141 MMOL/L (ref 136–145)
WBC # BLD AUTO: 4.95 K/UL (ref 3.9–12.7)

## 2023-06-26 PROCEDURE — 85652 RBC SED RATE AUTOMATED: CPT | Performed by: STUDENT IN AN ORGANIZED HEALTH CARE EDUCATION/TRAINING PROGRAM

## 2023-06-26 PROCEDURE — 36415 COLL VENOUS BLD VENIPUNCTURE: CPT | Mod: PO | Performed by: STUDENT IN AN ORGANIZED HEALTH CARE EDUCATION/TRAINING PROGRAM

## 2023-06-26 PROCEDURE — 85025 COMPLETE CBC W/AUTO DIFF WBC: CPT | Performed by: STUDENT IN AN ORGANIZED HEALTH CARE EDUCATION/TRAINING PROGRAM

## 2023-06-26 PROCEDURE — 80053 COMPREHEN METABOLIC PANEL: CPT | Performed by: STUDENT IN AN ORGANIZED HEALTH CARE EDUCATION/TRAINING PROGRAM

## 2023-06-26 PROCEDURE — 86140 C-REACTIVE PROTEIN: CPT | Performed by: STUDENT IN AN ORGANIZED HEALTH CARE EDUCATION/TRAINING PROGRAM

## 2023-07-03 ENCOUNTER — LAB VISIT (OUTPATIENT)
Dept: LAB | Facility: HOSPITAL | Age: 78
End: 2023-07-03
Attending: STUDENT IN AN ORGANIZED HEALTH CARE EDUCATION/TRAINING PROGRAM
Payer: MEDICARE

## 2023-07-03 ENCOUNTER — OFFICE VISIT (OUTPATIENT)
Dept: RHEUMATOLOGY | Facility: CLINIC | Age: 78
End: 2023-07-03
Payer: MEDICARE

## 2023-07-03 VITALS
HEIGHT: 63 IN | HEART RATE: 76 BPM | DIASTOLIC BLOOD PRESSURE: 87 MMHG | WEIGHT: 172.63 LBS | BODY MASS INDEX: 30.59 KG/M2 | SYSTOLIC BLOOD PRESSURE: 140 MMHG

## 2023-07-03 DIAGNOSIS — R30.0 DYSURIA: ICD-10-CM

## 2023-07-03 DIAGNOSIS — M05.10 DIFFUSE INTERSTITIAL RHEUMATOID DISEASE OF LUNG: ICD-10-CM

## 2023-07-03 DIAGNOSIS — M81.0 AGE-RELATED OSTEOPOROSIS WITHOUT CURRENT PATHOLOGICAL FRACTURE: ICD-10-CM

## 2023-07-03 DIAGNOSIS — Z51.81 MEDICATION MONITORING ENCOUNTER: ICD-10-CM

## 2023-07-03 DIAGNOSIS — Z71.89 MEDICATION CARE PLAN DISCUSSED WITH PATIENT: ICD-10-CM

## 2023-07-03 DIAGNOSIS — J98.4 RESTRICTIVE LUNG DISEASE: ICD-10-CM

## 2023-07-03 DIAGNOSIS — J84.9 ILD (INTERSTITIAL LUNG DISEASE): ICD-10-CM

## 2023-07-03 DIAGNOSIS — M81.0 AGE-RELATED OSTEOPOROSIS WITHOUT CURRENT PATHOLOGICAL FRACTURE: Primary | ICD-10-CM

## 2023-07-03 DIAGNOSIS — Z79.52 CURRENT CHRONIC USE OF SYSTEMIC STEROIDS: ICD-10-CM

## 2023-07-03 LAB
25(OH)D3+25(OH)D2 SERPL-MCNC: 39 NG/ML (ref 30–96)
ALBUMIN SERPL BCP-MCNC: 3.6 G/DL (ref 3.5–5.2)
ALP SERPL-CCNC: 74 U/L (ref 55–135)
ALT SERPL W/O P-5'-P-CCNC: 13 U/L (ref 10–44)
ANION GAP SERPL CALC-SCNC: 9 MMOL/L (ref 8–16)
AST SERPL-CCNC: 15 U/L (ref 10–40)
BACTERIA #/AREA URNS HPF: ABNORMAL /HPF
BILIRUB SERPL-MCNC: 0.3 MG/DL (ref 0.1–1)
BILIRUB UR QL STRIP: NEGATIVE
BUN SERPL-MCNC: 25 MG/DL (ref 8–23)
CALCIUM SERPL-MCNC: 9.7 MG/DL (ref 8.7–10.5)
CHLORIDE SERPL-SCNC: 107 MMOL/L (ref 95–110)
CLARITY UR: ABNORMAL
CO2 SERPL-SCNC: 24 MMOL/L (ref 23–29)
COLOR UR: YELLOW
CREAT SERPL-MCNC: 1 MG/DL (ref 0.5–1.4)
EST. GFR  (NO RACE VARIABLE): 57 ML/MIN/1.73 M^2
GLUCOSE SERPL-MCNC: 67 MG/DL (ref 70–110)
GLUCOSE UR QL STRIP: NEGATIVE
HGB UR QL STRIP: ABNORMAL
HYALINE CASTS #/AREA URNS LPF: 1 /LPF
KETONES UR QL STRIP: NEGATIVE
LEUKOCYTE ESTERASE UR QL STRIP: ABNORMAL
MICROSCOPIC COMMENT: ABNORMAL
NITRITE UR QL STRIP: POSITIVE
PH UR STRIP: 6 [PH] (ref 5–8)
POTASSIUM SERPL-SCNC: 4.2 MMOL/L (ref 3.5–5.1)
PROT SERPL-MCNC: 7 G/DL (ref 6–8.4)
PROT UR QL STRIP: ABNORMAL
RBC #/AREA URNS HPF: 19 /HPF (ref 0–4)
SODIUM SERPL-SCNC: 140 MMOL/L (ref 136–145)
SP GR UR STRIP: 1.02 (ref 1–1.03)
SQUAMOUS #/AREA URNS HPF: 6 /HPF
UNIDENT CRYS URNS QL MICRO: ABNORMAL
URN SPEC COLLECT METH UR: ABNORMAL
UROBILINOGEN UR STRIP-ACNC: NEGATIVE EU/DL
WBC #/AREA URNS HPF: >100 /HPF (ref 0–5)
WBC CLUMPS URNS QL MICRO: ABNORMAL
YEAST URNS QL MICRO: ABNORMAL

## 2023-07-03 PROCEDURE — 1126F AMNT PAIN NOTED NONE PRSNT: CPT | Mod: CPTII,S$GLB,, | Performed by: STUDENT IN AN ORGANIZED HEALTH CARE EDUCATION/TRAINING PROGRAM

## 2023-07-03 PROCEDURE — 87077 CULTURE AEROBIC IDENTIFY: CPT | Performed by: STUDENT IN AN ORGANIZED HEALTH CARE EDUCATION/TRAINING PROGRAM

## 2023-07-03 PROCEDURE — 99999 PR PBB SHADOW E&M-EST. PATIENT-LVL III: ICD-10-PCS | Mod: PBBFAC,,, | Performed by: STUDENT IN AN ORGANIZED HEALTH CARE EDUCATION/TRAINING PROGRAM

## 2023-07-03 PROCEDURE — 81000 URINALYSIS NONAUTO W/SCOPE: CPT | Performed by: STUDENT IN AN ORGANIZED HEALTH CARE EDUCATION/TRAINING PROGRAM

## 2023-07-03 PROCEDURE — 99999 PR PBB SHADOW E&M-EST. PATIENT-LVL III: CPT | Mod: PBBFAC,,, | Performed by: STUDENT IN AN ORGANIZED HEALTH CARE EDUCATION/TRAINING PROGRAM

## 2023-07-03 PROCEDURE — 80053 COMPREHEN METABOLIC PANEL: CPT | Performed by: STUDENT IN AN ORGANIZED HEALTH CARE EDUCATION/TRAINING PROGRAM

## 2023-07-03 PROCEDURE — 87088 URINE BACTERIA CULTURE: CPT | Performed by: STUDENT IN AN ORGANIZED HEALTH CARE EDUCATION/TRAINING PROGRAM

## 2023-07-03 PROCEDURE — 87186 SC STD MICRODIL/AGAR DIL: CPT | Performed by: STUDENT IN AN ORGANIZED HEALTH CARE EDUCATION/TRAINING PROGRAM

## 2023-07-03 PROCEDURE — 3077F SYST BP >= 140 MM HG: CPT | Mod: CPTII,S$GLB,, | Performed by: STUDENT IN AN ORGANIZED HEALTH CARE EDUCATION/TRAINING PROGRAM

## 2023-07-03 PROCEDURE — 1160F RVW MEDS BY RX/DR IN RCRD: CPT | Mod: CPTII,S$GLB,, | Performed by: STUDENT IN AN ORGANIZED HEALTH CARE EDUCATION/TRAINING PROGRAM

## 2023-07-03 PROCEDURE — 1126F PR PAIN SEVERITY QUANTIFIED, NO PAIN PRESENT: ICD-10-PCS | Mod: CPTII,S$GLB,, | Performed by: STUDENT IN AN ORGANIZED HEALTH CARE EDUCATION/TRAINING PROGRAM

## 2023-07-03 PROCEDURE — 99214 OFFICE O/P EST MOD 30 MIN: CPT | Mod: S$GLB,,, | Performed by: STUDENT IN AN ORGANIZED HEALTH CARE EDUCATION/TRAINING PROGRAM

## 2023-07-03 PROCEDURE — 3288F PR FALLS RISK ASSESSMENT DOCUMENTED: ICD-10-PCS | Mod: CPTII,S$GLB,, | Performed by: STUDENT IN AN ORGANIZED HEALTH CARE EDUCATION/TRAINING PROGRAM

## 2023-07-03 PROCEDURE — 1160F PR REVIEW ALL MEDS BY PRESCRIBER/CLIN PHARMACIST DOCUMENTED: ICD-10-PCS | Mod: CPTII,S$GLB,, | Performed by: STUDENT IN AN ORGANIZED HEALTH CARE EDUCATION/TRAINING PROGRAM

## 2023-07-03 PROCEDURE — 1159F PR MEDICATION LIST DOCUMENTED IN MEDICAL RECORD: ICD-10-PCS | Mod: CPTII,S$GLB,, | Performed by: STUDENT IN AN ORGANIZED HEALTH CARE EDUCATION/TRAINING PROGRAM

## 2023-07-03 PROCEDURE — 87086 URINE CULTURE/COLONY COUNT: CPT | Performed by: STUDENT IN AN ORGANIZED HEALTH CARE EDUCATION/TRAINING PROGRAM

## 2023-07-03 PROCEDURE — 3288F FALL RISK ASSESSMENT DOCD: CPT | Mod: CPTII,S$GLB,, | Performed by: STUDENT IN AN ORGANIZED HEALTH CARE EDUCATION/TRAINING PROGRAM

## 2023-07-03 PROCEDURE — 1101F PT FALLS ASSESS-DOCD LE1/YR: CPT | Mod: CPTII,S$GLB,, | Performed by: STUDENT IN AN ORGANIZED HEALTH CARE EDUCATION/TRAINING PROGRAM

## 2023-07-03 PROCEDURE — 1159F MED LIST DOCD IN RCRD: CPT | Mod: CPTII,S$GLB,, | Performed by: STUDENT IN AN ORGANIZED HEALTH CARE EDUCATION/TRAINING PROGRAM

## 2023-07-03 PROCEDURE — 82306 VITAMIN D 25 HYDROXY: CPT | Performed by: STUDENT IN AN ORGANIZED HEALTH CARE EDUCATION/TRAINING PROGRAM

## 2023-07-03 PROCEDURE — 36415 COLL VENOUS BLD VENIPUNCTURE: CPT | Performed by: STUDENT IN AN ORGANIZED HEALTH CARE EDUCATION/TRAINING PROGRAM

## 2023-07-03 PROCEDURE — 3077F PR MOST RECENT SYSTOLIC BLOOD PRESSURE >= 140 MM HG: ICD-10-PCS | Mod: CPTII,S$GLB,, | Performed by: STUDENT IN AN ORGANIZED HEALTH CARE EDUCATION/TRAINING PROGRAM

## 2023-07-03 PROCEDURE — 3079F DIAST BP 80-89 MM HG: CPT | Mod: CPTII,S$GLB,, | Performed by: STUDENT IN AN ORGANIZED HEALTH CARE EDUCATION/TRAINING PROGRAM

## 2023-07-03 PROCEDURE — 1101F PR PT FALLS ASSESS DOC 0-1 FALLS W/OUT INJ PAST YR: ICD-10-PCS | Mod: CPTII,S$GLB,, | Performed by: STUDENT IN AN ORGANIZED HEALTH CARE EDUCATION/TRAINING PROGRAM

## 2023-07-03 PROCEDURE — 3079F PR MOST RECENT DIASTOLIC BLOOD PRESSURE 80-89 MM HG: ICD-10-PCS | Mod: CPTII,S$GLB,, | Performed by: STUDENT IN AN ORGANIZED HEALTH CARE EDUCATION/TRAINING PROGRAM

## 2023-07-03 PROCEDURE — 99214 PR OFFICE/OUTPT VISIT, EST, LEVL IV, 30-39 MIN: ICD-10-PCS | Mod: S$GLB,,, | Performed by: STUDENT IN AN ORGANIZED HEALTH CARE EDUCATION/TRAINING PROGRAM

## 2023-07-03 RX ORDER — SIMVASTATIN 40 MG/1
40 TABLET, FILM COATED ORAL NIGHTLY
Qty: 90 TABLET | Refills: 3 | Status: SHIPPED | OUTPATIENT
Start: 2023-07-03

## 2023-07-03 RX ORDER — MYCOPHENOLATE MOFETIL 500 MG/1
1000 TABLET ORAL 2 TIMES DAILY
Qty: 360 TABLET | Refills: 2 | Status: SHIPPED | OUTPATIENT
Start: 2023-07-03

## 2023-07-03 RX ORDER — PREDNISONE 5 MG/1
5 TABLET ORAL EVERY OTHER DAY
Qty: 45 TABLET | Refills: 1 | Status: SHIPPED | OUTPATIENT
Start: 2023-07-03

## 2023-07-03 NOTE — Clinical Note
Sorry, but can you call her again and let her know that she should hold the Cellcept until she finished the antibiotics. Thanks!

## 2023-07-05 ENCOUNTER — TELEPHONE (OUTPATIENT)
Dept: RHEUMATOLOGY | Facility: CLINIC | Age: 78
End: 2023-07-05
Payer: MEDICARE

## 2023-07-05 LAB — BACTERIA UR CULT: ABNORMAL

## 2023-07-05 RX ORDER — NITROFURANTOIN 25; 75 MG/1; MG/1
100 CAPSULE ORAL 2 TIMES DAILY
Qty: 14 CAPSULE | Refills: 0 | Status: SHIPPED | OUTPATIENT
Start: 2023-07-05 | End: 2023-07-12

## 2023-07-05 NOTE — TELEPHONE ENCOUNTER
----- Message from Cal Ngo MD sent at 7/5/2023  4:16 PM CDT -----  Please let patient know that her urinalysis is indicative of urinary tract infection as she suspected.  We will send in prescription for Macrobid.

## 2023-07-05 NOTE — TELEPHONE ENCOUNTER
Patient was notified of lab/imaging results per provider. Patient verbalized understanding. All questions answered.  Patient was grateful for the call. -DESTIN

## 2023-07-05 NOTE — PROGRESS NOTES
Please let patient know that her urinalysis is indicative of urinary tract infection as she suspected.  We will send in prescription for Macrobid.

## 2023-07-06 NOTE — PROGRESS NOTES
RHEUMATOLOGY CLINIC ESTABLISHED PATIENT VISIT    Reason for consult:- ILD    Chief complaints, HPI, ROS, EXAM, Assessment & Plans:-    Jace Dean is a 78 y.o. pleasant female who presents to follow-up from her previous visit with Janie on September 19th for management of ILD associated with elevated rheumatoid factor/UIP pattern.  She is been compliant with her CellCept but states the liquid formulation is very expensive.  She is not on prednisone currently.  She does feel like her breathing symptoms have gotten somewhat worse since being off of prednisone.  She is not currently taking any medicine for her bone density.  She was on Fosamax previously but was advised that she may be developing ONJ and was told to discontinue.  No current issues with her jaw and no plans for invasive dental work.  She does report having sensation that she may have a urinary tract infection with increased frequency/dysuria.  No new symptoms otherwise.  Denies joint swelling.  No sclerodactyly.  No telangiectasias noted.  Rheumatologic review of systems otherwise negative. No evidence of synovitis, dactylitis or enthesitis.    Reviewed all available old and outside pertinent medical records.    All lab results personally reviewed and interpreted by me.    1. Age-related osteoporosis without current pathological fracture    2. Dysuria    3. ILD (interstitial lung disease)    4. Medication monitoring encounter    5. Diffuse interstitial rheumatoid disease of lung    6. Restrictive lung disease    7. Current chronic use of systemic steroids    8. Medication care plan discussed with patient        Problem List Items Addressed This Visit          Pulmonary    ILD (interstitial lung disease)    Relevant Medications    mycophenolate (CELLCEPT) 500 mg Tab    predniSONE (DELTASONE) 5 MG tablet       Orthopedic    Age-related osteoporosis without current pathological fracture - Primary    Relevant Orders    Comprehensive Metabolic Panel  (Completed)    Vitamin D (Completed)     Other Visit Diagnoses       Dysuria        Relevant Orders    Urinalysis, Reflex to Urine Culture Urine, Clean Catch    Urinalysis, Reflex to Urine Culture Urine, Clean Catch (Completed)    Medication monitoring encounter        Diffuse interstitial rheumatoid disease of lung        Relevant Medications    predniSONE (DELTASONE) 5 MG tablet    Restrictive lung disease        Relevant Medications    predniSONE (DELTASONE) 5 MG tablet    Current chronic use of systemic steroids        Relevant Medications    predniSONE (DELTASONE) 5 MG tablet    Medication care plan discussed with patient        Relevant Medications    predniSONE (DELTASONE) 5 MG tablet            Patient following up for interstitial lung disease with autoimmune features  We will provide prescription for CellCept tablets to be taken 1000 mg b.i.d.  Recommend she see if she feels better on prednisone 5 mg every other day  We will plan to treat her bone density with Prolia every 6 months  Check calcium and vitamin-D today  Counseled on all possible adverse effects of Prolia including but not limited to osteonecrosis of the jaw, atypical femur fractures and hypocalcemia  Encouraged to obtain 1200 mg daily of calcium and vitamin D3 5000 IU    # Follow up in about 4 months (around 11/3/2023).    Chronic comorbid conditions affecting medical decision making today:    Past Medical History:   Diagnosis Date    Hypertension        History reviewed. No pertinent surgical history.     Social History     Tobacco Use    Smoking status: Never    Smokeless tobacco: Never   Substance Use Topics    Alcohol use: No     Comment: OCC       Family History   Problem Relation Age of Onset    Cataracts Father     Cataracts Mother        Review of patient's allergies indicates:   Allergen Reactions    Meperidine     Penicillins     Tetracyclines        Medication List with Changes/Refills   New Medications    MYCOPHENOLATE (CELLCEPT)  500 MG TAB    Take 2 tablets (1,000 mg total) by mouth 2 (two) times daily.    NITROFURANTOIN, MACROCRYSTAL-MONOHYDRATE, (MACROBID) 100 MG CAPSULE    Take 1 capsule (100 mg total) by mouth 2 (two) times daily. for 7 days   Current Medications    ALBUTEROL (PROVENTIL) 2.5 MG /3 ML (0.083 %) NEBULIZER SOLUTION    Take 2.5 mg by nebulization every 6 (six) hours as needed.    ALBUTEROL SULFATE (PROAIR RESPICLICK) 90 MCG/ACTUATION INHALER    Inhale into the lungs 4 (four) times daily as needed.    CALCIUM CARBONATE (CALCIUM 500 ORAL)    Take by mouth.    FUROSEMIDE (LASIX) 20 MG TABLET    Take 1 tablet (20 mg total) by mouth daily as needed (edema).    MV-MN/IRON/FOLIC ACID/HERB 190 (VITAMIN D3 COMPLETE ORAL)    Take by mouth.   Changed and/or Refilled Medications    Modified Medication Previous Medication    PREDNISONE (DELTASONE) 5 MG TABLET predniSONE (DELTASONE) 5 MG tablet       Take 1 tablet (5 mg total) by mouth every other day.    Take 1 tablet (5 mg total) by mouth once daily.    SIMVASTATIN (ZOCOR) 40 MG TABLET simvastatin (ZOCOR) 40 MG tablet       Take 1 tablet (40 mg total) by mouth every evening.    Take 40 mg by mouth every evening.   Discontinued Medications    MYCOPHENOLATE MOFETIL (CELLCEPT) 200 MG/ML SUSR    Take 5 mLs (1,000 mg total) by mouth 2 (two) times a day.         Disclaimer: This note was prepared using voice recognition system and is likely to have sound alike errors and is not proofread.  Please message me with any questions.    32 minutes of total time spent on the encounter, which includes face to face time and non-face to face time preparing to see the patient (eg, review of tests), Obtaining and/or reviewing separately obtained history, Documenting clinical information in the electronic or other health record, Independently interpreting results (not separately reported) and communicating results to the patient/family/caregiver, or Care coordination (not separately reported).     Thank  you for allowing me to participate in the care of Jace Dean.    Cal Ngo MD

## 2023-07-14 ENCOUNTER — TELEPHONE (OUTPATIENT)
Dept: RHEUMATOLOGY | Facility: CLINIC | Age: 78
End: 2023-07-14
Payer: MEDICARE

## 2023-07-28 ENCOUNTER — LAB VISIT (OUTPATIENT)
Dept: LAB | Facility: HOSPITAL | Age: 78
End: 2023-07-28
Attending: STUDENT IN AN ORGANIZED HEALTH CARE EDUCATION/TRAINING PROGRAM
Payer: MEDICARE

## 2023-07-28 DIAGNOSIS — Z51.81 ENCOUNTER FOR MEDICATION MONITORING: ICD-10-CM

## 2023-07-28 DIAGNOSIS — J84.9 ILD (INTERSTITIAL LUNG DISEASE): ICD-10-CM

## 2023-07-28 LAB
ALBUMIN SERPL BCP-MCNC: 3.5 G/DL (ref 3.5–5.2)
ALP SERPL-CCNC: 80 U/L (ref 55–135)
ALT SERPL W/O P-5'-P-CCNC: 16 U/L (ref 10–44)
ANION GAP SERPL CALC-SCNC: 11 MMOL/L (ref 8–16)
AST SERPL-CCNC: 18 U/L (ref 10–40)
BILIRUB SERPL-MCNC: 0.3 MG/DL (ref 0.1–1)
BUN SERPL-MCNC: 33 MG/DL (ref 8–23)
CALCIUM SERPL-MCNC: 9.6 MG/DL (ref 8.7–10.5)
CHLORIDE SERPL-SCNC: 106 MMOL/L (ref 95–110)
CO2 SERPL-SCNC: 20 MMOL/L (ref 23–29)
CREAT SERPL-MCNC: 2 MG/DL (ref 0.5–1.4)
EST. GFR  (NO RACE VARIABLE): 24.8 ML/MIN/1.73 M^2
GLUCOSE SERPL-MCNC: 86 MG/DL (ref 70–110)
POTASSIUM SERPL-SCNC: 4.3 MMOL/L (ref 3.5–5.1)
PROT SERPL-MCNC: 7 G/DL (ref 6–8.4)
SODIUM SERPL-SCNC: 137 MMOL/L (ref 136–145)

## 2023-07-28 PROCEDURE — 80053 COMPREHEN METABOLIC PANEL: CPT | Performed by: STUDENT IN AN ORGANIZED HEALTH CARE EDUCATION/TRAINING PROGRAM

## 2023-07-28 PROCEDURE — 36415 COLL VENOUS BLD VENIPUNCTURE: CPT | Mod: PO | Performed by: STUDENT IN AN ORGANIZED HEALTH CARE EDUCATION/TRAINING PROGRAM

## 2023-07-31 DIAGNOSIS — N17.9 AKI (ACUTE KIDNEY INJURY): Primary | ICD-10-CM

## 2023-07-31 NOTE — PROGRESS NOTES
Significant decline in kidney function from previous. recommend nephrology evaluation and repeat.  We will place referral.

## 2023-08-02 ENCOUNTER — TELEPHONE (OUTPATIENT)
Dept: RHEUMATOLOGY | Facility: CLINIC | Age: 78
End: 2023-08-02
Payer: MEDICARE

## 2023-08-02 NOTE — TELEPHONE ENCOUNTER
----- Message from Cal Ngo MD sent at 7/31/2023  6:05 PM CDT -----  Significant decline in kidney function from previous. recommend nephrology evaluation and repeat.  We will place referral.

## 2023-08-04 ENCOUNTER — TELEPHONE (OUTPATIENT)
Dept: PULMONOLOGY | Facility: CLINIC | Age: 78
End: 2023-08-04
Payer: MEDICARE

## 2023-08-04 ENCOUNTER — HOSPITAL ENCOUNTER (OUTPATIENT)
Dept: RADIOLOGY | Facility: HOSPITAL | Age: 78
Discharge: HOME | End: 2023-08-04
Attending: INTERNAL MEDICINE
Payer: MEDICARE

## 2023-08-04 DIAGNOSIS — R06.02 SOBOE (SHORTNESS OF BREATH ON EXERTION): Primary | ICD-10-CM

## 2023-08-04 DIAGNOSIS — J84.9 ILD (INTERSTITIAL LUNG DISEASE): ICD-10-CM

## 2023-08-04 DIAGNOSIS — M05.10 DIFFUSE INTERSTITIAL RHEUMATOID DISEASE OF LUNG: ICD-10-CM

## 2023-08-04 PROCEDURE — 71046 XR CHEST PA AND LATERAL: ICD-10-PCS | Mod: 26,,, | Performed by: RADIOLOGY

## 2023-08-04 PROCEDURE — 71046 X-RAY EXAM CHEST 2 VIEWS: CPT | Mod: 26,,, | Performed by: RADIOLOGY

## 2023-08-04 PROCEDURE — 71046 X-RAY EXAM CHEST 2 VIEWS: CPT | Mod: TC

## 2023-08-23 ENCOUNTER — TELEPHONE (OUTPATIENT)
Dept: INFUSION THERAPY | Facility: HOSPITAL | Age: 78
End: 2023-08-23
Payer: MEDICARE

## 2023-08-23 NOTE — TELEPHONE ENCOUNTER
I contacted patient.  She said she has been sick.  She is better now.  She said she did not know what kind of infusion Dr Ngo ordered.  I told her there was an order for a prolia injection.  She said no she is supposed to get an infusion.  I sent a message to the Rheumatology navigator to see what patient was supposed to get.

## 2023-08-23 NOTE — TELEPHONE ENCOUNTER
----- Message from Libra Davey sent at 8/18/2023  9:37 AM CDT -----  Pt would like to reschedule the appt she missed on 07/17/2023. Call back number is [phone]. Thx. EL

## 2023-08-25 ENCOUNTER — CLINICAL SUPPORT (OUTPATIENT)
Dept: PULMONOLOGY | Facility: CLINIC | Age: 78
End: 2023-08-25
Payer: MEDICARE

## 2023-08-25 ENCOUNTER — TELEPHONE (OUTPATIENT)
Dept: INFUSION THERAPY | Facility: HOSPITAL | Age: 78
End: 2023-08-25
Payer: MEDICARE

## 2023-08-25 ENCOUNTER — OFFICE VISIT (OUTPATIENT)
Dept: PULMONOLOGY | Facility: CLINIC | Age: 78
End: 2023-08-25
Payer: MEDICARE

## 2023-08-25 VITALS
HEART RATE: 60 BPM | RESPIRATION RATE: 20 BRPM | HEIGHT: 63 IN | DIASTOLIC BLOOD PRESSURE: 82 MMHG | OXYGEN SATURATION: 95 % | BODY MASS INDEX: 31.01 KG/M2 | WEIGHT: 175 LBS | SYSTOLIC BLOOD PRESSURE: 126 MMHG

## 2023-08-25 VITALS — WEIGHT: 175.5 LBS | BODY MASS INDEX: 31.1 KG/M2 | HEIGHT: 63 IN

## 2023-08-25 DIAGNOSIS — R76.8 RHEUMATOID FACTOR POSITIVE: ICD-10-CM

## 2023-08-25 DIAGNOSIS — R06.02 SOBOE (SHORTNESS OF BREATH ON EXERTION): ICD-10-CM

## 2023-08-25 DIAGNOSIS — J84.9 ILD (INTERSTITIAL LUNG DISEASE): Primary | ICD-10-CM

## 2023-08-25 DIAGNOSIS — J84.9 ILD (INTERSTITIAL LUNG DISEASE): ICD-10-CM

## 2023-08-25 LAB
BRPFT: NORMAL
FEF 25 75 CHG: -6.4 %
FEF 25 75 LLN: 0.63
FEF 25 75 POST REF: 129.1 %
FEF 25 75 PRE REF: 137.9 %
FEF 25 75 REF: 1.51
FET100 CHG: -3.1 %
FEV1 CHG: -0.5 %
FEV1 FVC CHG: -0.3 %
FEV1 FVC LLN: 62
FEV1 FVC POST REF: 106 %
FEV1 FVC PRE REF: 106.3 %
FEV1 FVC REF: 77
FEV1 LLN: 1.3
FEV1 POST REF: 85.9 %
FEV1 PRE REF: 86.3 %
FEV1 REF: 1.86
FVC CHG: -0.2 %
FVC LLN: 1.71
FVC POST REF: 80 %
FVC PRE REF: 80.2 %
FVC REF: 2.44
PEF CHG: -9.9 %
PEF LLN: 2.98
PEF POST REF: 93.9 %
PEF PRE REF: 104.2 %
PEF REF: 4.64
POST FEF 25 75: 1.95 L/S
POST FET 100: 7.15 SEC
POST FEV1 FVC: 81.69 %
POST FEV1: 1.6 L
POST FVC: 1.96 L
POST PEF: 4.36 L/S
PRE FEF 25 75: 2.08 L/S
PRE FET 100: 7.38 SEC
PRE FEV1 FVC: 81.97 %
PRE FEV1: 1.6 L
PRE FVC: 1.96 L
PRE PEF: 4.83 L/S

## 2023-08-25 PROCEDURE — 3288F FALL RISK ASSESSMENT DOCD: CPT | Mod: CPTII,S$GLB,, | Performed by: PHYSICIAN ASSISTANT

## 2023-08-25 PROCEDURE — 99214 PR OFFICE/OUTPT VISIT, EST, LEVL IV, 30-39 MIN: ICD-10-PCS | Mod: S$GLB,,, | Performed by: PHYSICIAN ASSISTANT

## 2023-08-25 PROCEDURE — 99214 OFFICE O/P EST MOD 30 MIN: CPT | Mod: S$GLB,,, | Performed by: PHYSICIAN ASSISTANT

## 2023-08-25 PROCEDURE — 1160F RVW MEDS BY RX/DR IN RCRD: CPT | Mod: CPTII,S$GLB,, | Performed by: PHYSICIAN ASSISTANT

## 2023-08-25 PROCEDURE — 94618 PULMONARY STRESS TESTING: CPT | Mod: S$GLB,,, | Performed by: INTERNAL MEDICINE

## 2023-08-25 PROCEDURE — 3079F PR MOST RECENT DIASTOLIC BLOOD PRESSURE 80-89 MM HG: ICD-10-PCS | Mod: CPTII,S$GLB,, | Performed by: PHYSICIAN ASSISTANT

## 2023-08-25 PROCEDURE — 94060 EVALUATION OF WHEEZING: CPT | Mod: S$GLB,,, | Performed by: INTERNAL MEDICINE

## 2023-08-25 PROCEDURE — 94618 PULMONARY STRESS TESTING: ICD-10-PCS | Mod: S$GLB,,, | Performed by: INTERNAL MEDICINE

## 2023-08-25 PROCEDURE — 99999 PR PBB SHADOW E&M-EST. PATIENT-LVL I: CPT | Mod: PBBFAC,,,

## 2023-08-25 PROCEDURE — 3074F PR MOST RECENT SYSTOLIC BLOOD PRESSURE < 130 MM HG: ICD-10-PCS | Mod: CPTII,S$GLB,, | Performed by: PHYSICIAN ASSISTANT

## 2023-08-25 PROCEDURE — 1159F PR MEDICATION LIST DOCUMENTED IN MEDICAL RECORD: ICD-10-PCS | Mod: CPTII,S$GLB,, | Performed by: PHYSICIAN ASSISTANT

## 2023-08-25 PROCEDURE — 1159F MED LIST DOCD IN RCRD: CPT | Mod: CPTII,S$GLB,, | Performed by: PHYSICIAN ASSISTANT

## 2023-08-25 PROCEDURE — 1160F PR REVIEW ALL MEDS BY PRESCRIBER/CLIN PHARMACIST DOCUMENTED: ICD-10-PCS | Mod: CPTII,S$GLB,, | Performed by: PHYSICIAN ASSISTANT

## 2023-08-25 PROCEDURE — 1101F PT FALLS ASSESS-DOCD LE1/YR: CPT | Mod: CPTII,S$GLB,, | Performed by: PHYSICIAN ASSISTANT

## 2023-08-25 PROCEDURE — 99999 PR PBB SHADOW E&M-EST. PATIENT-LVL I: ICD-10-PCS | Mod: PBBFAC,,,

## 2023-08-25 PROCEDURE — 1101F PR PT FALLS ASSESS DOC 0-1 FALLS W/OUT INJ PAST YR: ICD-10-PCS | Mod: CPTII,S$GLB,, | Performed by: PHYSICIAN ASSISTANT

## 2023-08-25 PROCEDURE — 3288F PR FALLS RISK ASSESSMENT DOCUMENTED: ICD-10-PCS | Mod: CPTII,S$GLB,, | Performed by: PHYSICIAN ASSISTANT

## 2023-08-25 PROCEDURE — 3074F SYST BP LT 130 MM HG: CPT | Mod: CPTII,S$GLB,, | Performed by: PHYSICIAN ASSISTANT

## 2023-08-25 PROCEDURE — 99999 PR PBB SHADOW E&M-EST. PATIENT-LVL IV: ICD-10-PCS | Mod: PBBFAC,,, | Performed by: PHYSICIAN ASSISTANT

## 2023-08-25 PROCEDURE — 94060 PR EVAL OF BRONCHOSPASM: ICD-10-PCS | Mod: S$GLB,,, | Performed by: INTERNAL MEDICINE

## 2023-08-25 PROCEDURE — 99999 PR PBB SHADOW E&M-EST. PATIENT-LVL IV: CPT | Mod: PBBFAC,,, | Performed by: PHYSICIAN ASSISTANT

## 2023-08-25 PROCEDURE — 3079F DIAST BP 80-89 MM HG: CPT | Mod: CPTII,S$GLB,, | Performed by: PHYSICIAN ASSISTANT

## 2023-08-25 NOTE — PROGRESS NOTES
Pulmonary Outpatient Follow Up Visit     Subjective:       Patient ID: Jace Dean is a 78 y.o. female.    Chief Complaint: ILD    8/25/23  Here for 6 months follow up of ILD  She states she stopped taking prednisone and cellcept due to frequent URIs  She reports she has been feeling great ever since; denies SOB, cough, chest pain, fever, or wheezing  Completed paul with DLCO and walk today    12/2022 Dr. Patterson:  77 y o f pt presenting for 6 months follow-up    12/20/2022 on prednisone 5 mg everyday and CellCept 1000 mg twice daily.  Was feeling better on prednisone 10 mg.  Osteoporosis noted on bone scan.  Feeling SOB on exertion.  Input with rheumatology PA September 2022 noted.      Initially evaluated 10/2021 for worsening chronic dry cough and shortness of breath at rest and on exertion over > 1 YR .     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       Denies previous history of asthma.      She has seen Dr. King  in the past.       Diagnosed with interstitial lung disease with autoimmune features likely related to rheumatoid arthritis elevated rheumatoid factor 150, CellCept suggested by Dr. Banks Rheumatology but the patient was concerned about side effects i.e. cancer due the fact that she has family history of ovarian and breast cancers.    Complains of claudication of her lower extremity at rest and on exertion.  Arterial lower extremity ultrasound unremarkable.    I started on prednisone 10 mg April 2022 and she did feel a drastic improvement in her shortness of breath.  Spirometry with DLCO post prednisone + significant DLCO improvement.    Review of Systems   Constitutional:  Positive for fatigue. Negative for fever and chills.   HENT:  Negative for nosebleeds.    Eyes:  Negative for redness.   Respiratory:  Negative for cough, choking, shortness of breath, dyspnea on extertion and use  "of rescue inhaler.    Cardiovascular:  Negative for chest pain.        Bilateral lower extremity claudication   Genitourinary:  Negative for hematuria.   Endocrine:  Negative for cold intolerance.    Musculoskeletal:  Positive for arthralgias.   Skin:  Negative for rash.   Gastrointestinal:  Negative for vomiting.   Neurological:  Negative for syncope.   Hematological:  Negative for adenopathy.   Psychiatric/Behavioral:  Negative for confusion.        Outpatient Encounter Medications as of 8/25/2023   Medication Sig Dispense Refill    albuterol (PROVENTIL) 2.5 mg /3 mL (0.083 %) nebulizer solution Take 2.5 mg by nebulization every 6 (six) hours as needed.      albuterol sulfate (PROAIR RESPICLICK) 90 mcg/actuation inhaler Inhale into the lungs 4 (four) times daily as needed.      furosemide (LASIX) 20 MG tablet Take 1 tablet (20 mg total) by mouth daily as needed (edema). 90 tablet 0    mv-mn/iron/folic acid/herb 190 (VITAMIN D3 COMPLETE ORAL) Take by mouth.      mycophenolate (CELLCEPT) 500 mg Tab Take 2 tablets (1,000 mg total) by mouth 2 (two) times daily. 360 tablet 2    predniSONE (DELTASONE) 5 MG tablet Take 1 tablet (5 mg total) by mouth every other day. 45 tablet 1    simvastatin (ZOCOR) 40 MG tablet Take 1 tablet (40 mg total) by mouth every evening. 90 tablet 3    calcium carbonate (CALCIUM 500 ORAL) Take by mouth.       No facility-administered encounter medications on file as of 8/25/2023.       Objective:     Vital Signs (Most Recent)  Vital Signs  Pulse: 60  Resp: 20  SpO2: 95 %  BP: 126/82  Height and Weight  Height: 5' 3" (160 cm)  Weight: 79.4 kg (175 lb)  BSA (Calculated - sq m): 1.88 sq meters  BMI (Calculated): 31  Weight in (lb) to have BMI = 25: 140.8]  Wt Readings from Last 2 Encounters:   08/25/23 79.4 kg (175 lb)   08/25/23 79.6 kg (175 lb 7.8 oz)       Physical Exam   Constitutional: She is oriented to person, place, and time. She appears well-developed and well-nourished. No distress. " "  HENT:   Head: Normocephalic.   Cardiovascular: Normal rate and regular rhythm.   Pulmonary/Chest: Normal expansion and effort normal. No stridor. No respiratory distress. She has no rales. She exhibits no tenderness.   Bilateral crackles ++ lower fields    Abdominal: She exhibits no distension.   Musculoskeletal:         General: No tenderness.      Cervical back: Neck supple.   Lymphadenopathy:     She has no cervical adenopathy.   Neurological: She is alert and oriented to person, place, and time. Gait normal.   Skin: Skin is warm. Nails show no clubbing.   Psychiatric: She has a normal mood and affect. Her behavior is normal. Judgment and thought content normal.   Nursing note and vitals reviewed.      Laboratory  Lab Results   Component Value Date    WBC 4.95 06/26/2023    RBC 4.28 06/26/2023    HGB 12.5 06/26/2023    HCT 40.5 06/26/2023    MCV 95 06/26/2023    MCH 29.2 06/26/2023    MCHC 30.9 (L) 06/26/2023    RDW 13.1 06/26/2023     06/26/2023    MPV 10.5 06/26/2023    GRAN 1.9 06/26/2023    GRAN 37.8 (L) 06/26/2023    LYMPH 2.1 06/26/2023    LYMPH 42.6 06/26/2023    MONO 0.6 06/26/2023    MONO 11.3 06/26/2023    EOS 0.3 06/26/2023    BASO 0.05 06/26/2023    EOSINOPHIL 6.9 06/26/2023    BASOPHIL 1.0 06/26/2023       BMP  Lab Results   Component Value Date     08/04/2023    K 3.8 08/04/2023     08/04/2023    CO2 20 (L) 08/04/2023    BUN 20 08/04/2023    CREATININE 1.3 08/04/2023    CALCIUM 9.5 08/04/2023    ANIONGAP 10 08/04/2023    ESTGFRAFRICA 42 (A) 01/06/2022    EGFRNONAA 36 (A) 01/06/2022    AST 17 08/04/2023    ALT 13 08/04/2023    PROT 7.0 08/04/2023       Lab Results   Component Value Date    BNP 44 10/27/2021       No results found for: "TSH"    Lab Results   Component Value Date    SEDRATE 63 (H) 06/26/2023       Lab Results   Component Value Date    CRP 2.6 06/26/2023     Lab Results   Component Value Date     (H) 10/27/2021        No results found for: "ASPERGILLUS"  No " "results found for: "AFUMIGATUSCL"     Lab Results   Component Value Date    ACE 27 12/14/2021        Diagnostic Results:  I have personally reviewed today the following studies:    Chest x-ray 12/20/2022    CLINICAL HISTORY:  Interstitial pulmonary disease, unspecified     TECHNIQUE:  PA and lateral views of the chest were performed.     COMPARISON:  10/27/2021     FINDINGS:  The cardiomediastinal silhouette is with normal limits.  The lungs are well expanded.  There are moderate basilar predominant reticular changes without significant change.  There is no consolidation or pleural effusion.  There is a mild chronic compression fracture of what is likely T12.     Impression:     Moderate features of an underlying chronic interstitial lung process.  No significant change or evidence for superimposed acute process.         CT CHEST WITHOUT CONTRAST December 2021     CLINICAL HISTORY:  restrictive lung dz; Interstitial pulmonary disease, unspecified     TECHNIQUE:  Low dose axial images, sagittal and coronal reformations were obtained from the thoracic inlet to the lung bases. Contrast was not administered.     COMPARISON:  None     FINDINGS:  Base of Neck: No significant abnormality.     Thoracic soft tissues: Normal.     Aorta: Left-sided aortic arch.  Mild atherosclerosis.  No aneurysm.     Heart: Normal size.  Minimal fluid in the pericardial recesses.     Pulmonary vasculature: Pulmonary arteries distribute normally.  There are four pulmonary veins.     Daysi/Mediastinum: Multiple subcentimeter in short axis mediastinal lymph nodes.  No pathologic mercedez enlargement.     Airways: Patent.     Lungs/Pleura: Bilateral peripheral and basilar distribution of subpleural reticulation, most pronounced in the lower lobes and lingula with associated honeycombing.  No suspicious pulmonary nodules/masses.  Scattered low-grade ground-glass opacities also noted.  No consolidation or pleural effusion.     Esophagus: Normal.   "   Upper Abdomen: No abnormality of the partially imaged upper abdomen.     Bones: No acute fracture. No suspicious lytic or sclerotic lesions.     Impression:     Bilateral interstitial lung disease with appearance typical for UIP.                  Nuc stress 11/29/2021      Normal myocardial perfusion scan. There is no evidence of myocardial ischemia or infarction.    The gated perfusion images showed an ejection fraction of 88% at rest. The gated perfusion images showed an ejection fraction of 86% post stress.    The EKG portion of this study is negative for ischemia.    There were no arrhythmias during stress.       Echo 10/2021      Summary    Concentric hypertrophy and normal systolic function.  The ascending aorta is mildly dilated.  Mild tricuspid regurgitation.  The estimated ejection fraction is 60%.  Normal left ventricular diastolic function.  With normal right ventricular systolic function.  Normal central venous pressure (3 mmHg).  The estimated PA systolic pressure is 26 mmHg.      Six min 11/2021    Interpretation:   Total distance walked in six minutes is   severelyreduced and therefore  the overall  functional capacity is   severely reduced. There was no significant exercise induced hypoxemia.       PFT 2021    Grade A-D -acceptable quality of tracingsModerate restriction based on reduced Forced Vital Capacity (FVC). Consider lung volumes if clinically indicated.Mild restriction. (TLC< LLN and > or equal to 70% of predicted).Diffusion capacity is severely   reduced - unadjusted for hemoglobin (DLCO < 40% predicted).Maximum voluntary ventilation is moderately reduced. (MVV% predicted is 51% to 65% of predicted)Flow volume loops demonstrate a restrictive defect.Overall there is no significant ventilatory   impairment. (FEV1 >LLN)Flow volume loops demonstrate a restrictive defect.Pattern of restriction and decreased diffusion suggest interstitial pulmonary fibrosis. Clinical correlation suggested.        Spirometry 06/08/2022    Mild restriction with moderately severe DLCO reduction however DLCO have a fluid from 33% to 45%      Spirometry 12/20/2022    IMPROVED FVC.  STABLE DLCO    Oxygen Assessment Supplemental O2 Heart   Rate Blood Pressure Travis Dyspnea Scale Rating    Resting 94 % Room Air 85 bpm 126/82 2   Exercise             Minute             1 93 % Room Air 108 bpm       2 94 % Room Air 108 bpm       3 95 % Room Air 117 bpm       4 95 % Room Air 118 bpm       5 97 % Room Air 119 bpm       6  95 % Room Air 120 bpm 135/72 3   Recovery             Minute             1 97 % Room Air 101 bpm       2 97 % Room Air 99 bpm       3 95 % Room Air 97 bpm       4 97 % Room Air 88 bpm 123/78 1      Six Minute Walk Summary  6MWT Status: completed without stopping  Patient Reported: Dyspnea (hip pain)                 Interpretation:  Did the patient stop or pause?: No  Total Time Walked (Calculated): 360 seconds  Final Partial Lap Distance (feet): 125 feet  Total Distance Meters (Calculated): 281.94 meters  Predicted Distance Meters (Calculated): 359.92 meters  Percentage of Predicted (Calculated): 78.33  Peak VO2 (Calculated): 12.44  Mets: 3.55  Has The Patient Had a Previous Six Minute Walk Test?: Yes     Previous 6MWT Results  Has The Patient Had a Previous Six Minute Walk Test?: Yes  Date of Previous Test: 12/20/22  Total Time Walked: 289 seconds  Total Distance (meters): 243.84  Predicted Distance (meters): 363.18 meters  Percentage of Predicted: 67.14  Percent Change (Calculated): -0.16    8/25/23  Improved spirometry and stable DLCO  Assessment/Plan:   ILD (interstitial lung disease)  -     X-Ray Chest PA And Lateral; Future; Expected date: 08/25/2023  -     Spirometry with/without bronchodilator; Future  -     Stress test, pulmonary; Future; Expected date: 08/25/2023    Rheumatoid factor positive       Continue rheumatology follow up  Repeat chest x-ray spirometry and DLCO and 6 minute walking test in 1  year.  Follow up in about 1 year (around 8/25/2024) for f/u with pulmonologist for ILD.    This note was prepared using voice recognition system and is likely to have sound alike errors that may have been overlooked even after proof reading.  Please call me with any questions    Discussed diagnosis, its evaluation, treatment and usual course. All questions answered.      Fela Mauricio PA-C

## 2023-08-25 NOTE — PROCEDURES
"[facility] - Pulmonary Function  Six Minute Walk     SUMMARY     Ordering Provider: Dr. Patterson   Interpreting Provider: Dr. Pantoja  Performing nurse/tech/RT: LIZ Sebastian RRT  Diagnosis: Shortness of Breath  Height: 5' 3" (160 cm)  Weight: 79.6 kg (175 lb 7.8 oz)  BMI (Calculated): 31.1   Patient Race:             Phase Oxygen Assessment Supplemental O2 Heart   Rate Blood Pressure Travis Dyspnea Scale Rating   Resting 94 % Room Air 85 bpm 126/82 2   Exercise        Minute        1 93 % Room Air 108 bpm     2 94 % Room Air 108 bpm     3 95 % Room Air 117 bpm     4 95 % Room Air 118 bpm     5 97 % Room Air 119 bpm     6  95 % Room Air 120 bpm 135/72 3   Recovery        Minute        1 97 % Room Air 101 bpm     2 97 % Room Air 99 bpm     3 95 % Room Air 97 bpm     4 97 % Room Air 88 bpm 123/78 1     Six Minute Walk Summary  6MWT Status: completed without stopping  Patient Reported: Dyspnea (hip pain)     Interpretation:  Did the patient stop or pause?: No                                         Total Time Walked (Calculated): 360 seconds  Final Partial Lap Distance (feet): 125 feet  Total Distance Meters (Calculated): 281.94 meters  Predicted Distance Meters (Calculated): 359.92 meters  Percentage of Predicted (Calculated): 78.33  Peak VO2 (Calculated): 12.44  Mets: 3.55  Has The Patient Had a Previous Six Minute Walk Test?: Yes       Previous 6MWT Results  Has The Patient Had a Previous Six Minute Walk Test?: Yes  Date of Previous Test: 12/20/22  Total Time Walked: 289 seconds  Total Distance (meters): 243.84  Predicted Distance (meters): 363.18 meters  Percentage of Predicted: 67.14  Percent Change (Calculated): -0.16      Interpretation:  Total distance walked in six minutes is mildly reduced indicating a reduction in overall  functional capacity. The patient did not meet criteria for supplemental oxygen prescription.  Clinical correlation suggested.  [] Mild exercise-induced hypoxemia described as an arterial " oxygen saturation of 93-95% (with a fall of 3-4% with exercise),   [] Moderate exercise-induced hypoxemia as a fall in oxygen saturation to  89-93% (with a fall of 3-4 % with exercise)  [] Severe exercise induced hypoxemia as < 89% O2 saturation (88% and below).  Medicare Criteria for Oxygen prescription comments: When arterial oxygen saturation is at or below 88% during exercise (severe exercise induced hypoxemia) then the patient falls under   Details about Medicare Group Criteria coverage can be found at http://[email or web address].[email or web address]/manuals/downloads/     Gaurav Pantoja MD

## 2023-08-25 NOTE — TELEPHONE ENCOUNTER
Reviewed dr Ngo's orders and Discussed prolia injection protocol with pt. Verbalized understanding and scheduled cmp and prolia.

## 2023-08-25 NOTE — TELEPHONE ENCOUNTER
Patient calling to reschedule prolia.  Injection rescheduled per her preferences. Call ended well.

## 2023-08-25 NOTE — TELEPHONE ENCOUNTER
----- Message from Colleen Bridges MA sent at 8/23/2023  1:31 PM CDT -----  I spoke with patient.  She Dr Ngo wanted her to have an infusion.  She said she has been sick and on antibiotics.  She did not want to get an infusion until she was well.  I told her I had an order for a prolia injection  but she said Dr Ngo ordered an infusion not an injection.

## 2023-08-30 ENCOUNTER — LAB VISIT (OUTPATIENT)
Dept: LAB | Facility: HOSPITAL | Age: 78
End: 2023-08-30
Attending: STUDENT IN AN ORGANIZED HEALTH CARE EDUCATION/TRAINING PROGRAM
Payer: MEDICARE

## 2023-08-30 DIAGNOSIS — Z51.81 ENCOUNTER FOR MEDICATION MONITORING: ICD-10-CM

## 2023-08-30 DIAGNOSIS — J84.9 ILD (INTERSTITIAL LUNG DISEASE): ICD-10-CM

## 2023-08-30 PROCEDURE — 80053 COMPREHEN METABOLIC PANEL: CPT | Performed by: STUDENT IN AN ORGANIZED HEALTH CARE EDUCATION/TRAINING PROGRAM

## 2023-08-30 PROCEDURE — 36415 COLL VENOUS BLD VENIPUNCTURE: CPT | Mod: PO | Performed by: STUDENT IN AN ORGANIZED HEALTH CARE EDUCATION/TRAINING PROGRAM

## 2023-08-31 ENCOUNTER — INFUSION (OUTPATIENT)
Dept: INFUSION THERAPY | Facility: HOSPITAL | Age: 78
End: 2023-08-31
Attending: STUDENT IN AN ORGANIZED HEALTH CARE EDUCATION/TRAINING PROGRAM
Payer: MEDICARE

## 2023-08-31 VITALS
DIASTOLIC BLOOD PRESSURE: 70 MMHG | HEIGHT: 63 IN | WEIGHT: 174.81 LBS | SYSTOLIC BLOOD PRESSURE: 143 MMHG | TEMPERATURE: 98 F | BODY MASS INDEX: 30.97 KG/M2 | OXYGEN SATURATION: 99 % | RESPIRATION RATE: 18 BRPM | HEART RATE: 75 BPM

## 2023-08-31 DIAGNOSIS — M81.0 AGE-RELATED OSTEOPOROSIS WITHOUT CURRENT PATHOLOGICAL FRACTURE: Primary | ICD-10-CM

## 2023-08-31 LAB
ALBUMIN SERPL BCP-MCNC: 3.5 G/DL (ref 3.5–5.2)
ALP SERPL-CCNC: 65 U/L (ref 55–135)
ALT SERPL W/O P-5'-P-CCNC: 9 U/L (ref 10–44)
ANION GAP SERPL CALC-SCNC: 9 MMOL/L (ref 8–16)
AST SERPL-CCNC: 17 U/L (ref 10–40)
BILIRUB SERPL-MCNC: 0.4 MG/DL (ref 0.1–1)
BUN SERPL-MCNC: 25 MG/DL (ref 8–23)
CALCIUM SERPL-MCNC: 9.8 MG/DL (ref 8.7–10.5)
CHLORIDE SERPL-SCNC: 107 MMOL/L (ref 95–110)
CO2 SERPL-SCNC: 23 MMOL/L (ref 23–29)
CREAT SERPL-MCNC: 1.3 MG/DL (ref 0.5–1.4)
EST. GFR  (NO RACE VARIABLE): 41.6 ML/MIN/1.73 M^2
GLUCOSE SERPL-MCNC: 91 MG/DL (ref 70–110)
POTASSIUM SERPL-SCNC: 4.6 MMOL/L (ref 3.5–5.1)
PROT SERPL-MCNC: 7.3 G/DL (ref 6–8.4)
SODIUM SERPL-SCNC: 139 MMOL/L (ref 136–145)

## 2023-08-31 PROCEDURE — 63600175 PHARM REV CODE 636 W HCPCS: Mod: JZ,JG | Performed by: STUDENT IN AN ORGANIZED HEALTH CARE EDUCATION/TRAINING PROGRAM

## 2023-08-31 PROCEDURE — 96372 THER/PROPH/DIAG INJ SC/IM: CPT

## 2023-08-31 RX ADMIN — DENOSUMAB 60 MG: 60 INJECTION SUBCUTANEOUS at 02:08

## 2023-08-31 NOTE — DISCHARGE INSTRUCTIONS
.        THANKS FOR ALLOWING ME TO CARE FOR YOU TODAY!!! ~Obdulia          THANKS FOR CHOOSING [facility]!!!      [facility]  [address] [facility].  or  [id number] [place] [address] Drive  [phone] phone     [phone] fax  Hours of Operation: Monday- Friday 8:00am- 5:00pm  After hours phone  [phone]  Hematology / Oncology Physicians on call      LE[address] Dr[address] Dr., KADI Franklin, KADI Colbert, KADI Fried, SENDY    Please call with any concerns regarding your appointment today.      .WAYS TO HELP PREVENT INFECTION        WASH YOUR HANDS OFTEN DURING THE DAY, ESPECIALLY BEFORE YOU EAT, AFTER USING THE BATHROOM, AND AFTER TOUCHING ANIMALS    STAY AWAY FROM PEOPLE WHO HAVE ILLNESSES YOU CAN CATCH; SUCH AS COLDS, FLU, CHICKEN POX    TRY TO AVOID CROWDS    STAY AWAY FROM CHILDREN WHO RECENTLY HAVE RECEIVED LIVE VIRUS VACCINES    MAINTAIN GOOD MOUTH CARE    DO NOT SQUEEZE OR SCRATCH PIMPLES    CLEAN CUTS & SCRAPES RIGHT AWAY AND DAILY UNTIL HEALED WITH WARM WATER, SOAP & AN ANTISEPTIC    AVOID CONTACT WITH LITTER BOXES, BIRD CAGES, & FISH TANKS    AVOID STANDING WATER, IE., BIRD BATHS, FLOWER POTS/VASES, OR HUMIDIFIERS    WEAR GLOVES WHEN GARDENING OR CLEANING UP AFTER OTHERS, ESPECIALLY BABIES & SMALL CHILDREN    DO NOT EAT RAW FISH, SEAFOOD, MEAT, OR EGGS  .FALL PREVENTION   Falls often occur due to slipping, tripping or losing your balance. Here are ways to reduce your risk of falling again.   Was there anything that caused your fall that can be fixed, removed or replaced?   Make your home safe by keeping walkways clear of objects you may trip over.   Use non-slip pads under rugs.   Do not walk in poorly lit areas.   Do not stand on chairs or wobbly ladders.   Use caution when reaching overhead or looking upward. This position can cause a loss of balance.   Be sure your shoes fit properly, have non-slip bottoms and are  in good condition.   Be cautious when going up and down stairs, curbs, and when walking on uneven sidewalks.   If your balance is poor, consider using a cane or walker.   If your fall was related to alcohol use, stop or limit alcohol intake.   If your fall was related to use of sleeping medicines, talk to your doctor about this. You may need to reduce your dosage at bedtime if you awaken during the night to go to the bathroom.   To reduce the need for nighttime bathroom trips:   Avoid drinking fluids for several hours before going to bed   Empty your bladder before going to bed   Men can keep a urinal at the bedside   © 9593-1766 [facility], [address] [place] [address], [place] [address]. All rights reserved. This information is not intended as a substitute for professional medical care. Always follow your healthcare professional's instructions.

## 2023-08-31 NOTE — NURSING
Prolia 60 mg q 6 months  Last dose given-   First dose today    Any invasive dental procedures in past 3 months or upcoming 3 months: Denies        Recent labs? yes;  CKD pt needing repeat labs in 10 days-no   Lab Results   Component Value Date    CALCIUM 9.8 08/30/2023     Lab Results   Component Value Date    CREATININE 1.3 08/30/2023     Lab Results   Component Value Date    ESTGFRAFRICA 42 (A) 01/06/2022     Lab Results   Component Value Date    EGFRNONAA 36 (A) 01/06/2022     Lab Results   Component Value Date    [id number] 39 07/03/2023          Lot #- [id number]  Expiration Date- 09/30/25      Prolia 60 mg/ml administered SQ to Left lower abdominal quadrant. Tolerated without any complaints. No redness, swelling, or drainage noted to site. Instructed to remain in clinic 15 minutes after administration to monitor for any s/sx of reaction. Pt instructed on signs and symptoms of reaction to report. Verbalizes understanding.

## 2023-11-03 DIAGNOSIS — N18.30 STAGE 3 CHRONIC KIDNEY DISEASE, UNSPECIFIED WHETHER STAGE 3A OR 3B CKD: Primary | ICD-10-CM

## 2023-11-06 ENCOUNTER — LAB VISIT (OUTPATIENT)
Dept: LAB | Facility: HOSPITAL | Age: 78
End: 2023-11-06
Attending: INTERNAL MEDICINE
Payer: MEDICARE

## 2023-11-06 DIAGNOSIS — N18.30 STAGE 3 CHRONIC KIDNEY DISEASE, UNSPECIFIED WHETHER STAGE 3A OR 3B CKD: ICD-10-CM

## 2023-11-06 LAB
ANION GAP SERPL CALC-SCNC: 10 MMOL/L (ref 8–16)
BASOPHILS # BLD AUTO: 0.04 K/UL (ref 0–0.2)
BASOPHILS NFR BLD: 0.6 % (ref 0–1.9)
BUN SERPL-MCNC: 31 MG/DL (ref 8–23)
CALCIUM SERPL-MCNC: 9.7 MG/DL (ref 8.7–10.5)
CHLORIDE SERPL-SCNC: 107 MMOL/L (ref 95–110)
CO2 SERPL-SCNC: 25 MMOL/L (ref 23–29)
CREAT SERPL-MCNC: 1.2 MG/DL (ref 0.5–1.4)
DIFFERENTIAL METHOD: ABNORMAL
EOSINOPHIL # BLD AUTO: 0.4 K/UL (ref 0–0.5)
EOSINOPHIL NFR BLD: 5.2 % (ref 0–8)
ERYTHROCYTE [DISTWIDTH] IN BLOOD BY AUTOMATED COUNT: 13 % (ref 11.5–14.5)
EST. GFR  (NO RACE VARIABLE): 45.8 ML/MIN/1.73 M^2
GLUCOSE SERPL-MCNC: 117 MG/DL (ref 70–110)
HCT VFR BLD AUTO: 40.8 % (ref 37–48.5)
HGB BLD-MCNC: 13 G/DL (ref 12–16)
IMM GRANULOCYTES # BLD AUTO: 0.01 K/UL (ref 0–0.04)
IMM GRANULOCYTES NFR BLD AUTO: 0.1 % (ref 0–0.5)
LYMPHOCYTES # BLD AUTO: 1.7 K/UL (ref 1–4.8)
LYMPHOCYTES NFR BLD: 24.9 % (ref 18–48)
MCH RBC QN AUTO: 30.4 PG (ref 27–31)
MCHC RBC AUTO-ENTMCNC: 31.9 G/DL (ref 32–36)
MCV RBC AUTO: 96 FL (ref 82–98)
MONOCYTES # BLD AUTO: 0.6 K/UL (ref 0.3–1)
MONOCYTES NFR BLD: 8.7 % (ref 4–15)
NEUTROPHILS # BLD AUTO: 4 K/UL (ref 1.8–7.7)
NEUTROPHILS NFR BLD: 60.5 % (ref 38–73)
NRBC BLD-RTO: 0 /100 WBC
PLATELET # BLD AUTO: 204 K/UL (ref 150–450)
PMV BLD AUTO: 10.8 FL (ref 9.2–12.9)
POTASSIUM SERPL-SCNC: 4.3 MMOL/L (ref 3.5–5.1)
RBC # BLD AUTO: 4.27 M/UL (ref 4–5.4)
SODIUM SERPL-SCNC: 142 MMOL/L (ref 136–145)
WBC # BLD AUTO: 6.67 K/UL (ref 3.9–12.7)

## 2023-11-06 PROCEDURE — 80048 BASIC METABOLIC PNL TOTAL CA: CPT | Performed by: INTERNAL MEDICINE

## 2023-11-06 PROCEDURE — 36415 COLL VENOUS BLD VENIPUNCTURE: CPT | Mod: PO | Performed by: INTERNAL MEDICINE

## 2023-11-06 PROCEDURE — 85025 COMPLETE CBC W/AUTO DIFF WBC: CPT | Performed by: INTERNAL MEDICINE

## 2023-11-08 ENCOUNTER — OFFICE VISIT (OUTPATIENT)
Dept: NEPHROLOGY | Facility: CLINIC | Age: 78
End: 2023-11-08
Payer: MEDICARE

## 2023-11-08 VITALS
HEART RATE: 84 BPM | RESPIRATION RATE: 22 BRPM | BODY MASS INDEX: 31.01 KG/M2 | HEIGHT: 63 IN | WEIGHT: 175 LBS | DIASTOLIC BLOOD PRESSURE: 84 MMHG | SYSTOLIC BLOOD PRESSURE: 120 MMHG

## 2023-11-08 DIAGNOSIS — N17.9 AKI (ACUTE KIDNEY INJURY): ICD-10-CM

## 2023-11-08 PROCEDURE — 99999 PR PBB SHADOW E&M-EST. PATIENT-LVL III: CPT | Mod: PBBFAC,,, | Performed by: INTERNAL MEDICINE

## 2023-11-08 PROCEDURE — 3288F FALL RISK ASSESSMENT DOCD: CPT | Mod: CPTII,S$GLB,, | Performed by: INTERNAL MEDICINE

## 2023-11-08 PROCEDURE — 1159F MED LIST DOCD IN RCRD: CPT | Mod: CPTII,S$GLB,, | Performed by: INTERNAL MEDICINE

## 2023-11-08 PROCEDURE — 1101F PR PT FALLS ASSESS DOC 0-1 FALLS W/OUT INJ PAST YR: ICD-10-PCS | Mod: CPTII,S$GLB,, | Performed by: INTERNAL MEDICINE

## 2023-11-08 PROCEDURE — 3079F PR MOST RECENT DIASTOLIC BLOOD PRESSURE 80-89 MM HG: ICD-10-PCS | Mod: CPTII,S$GLB,, | Performed by: INTERNAL MEDICINE

## 2023-11-08 PROCEDURE — 99205 PR OFFICE/OUTPT VISIT, NEW, LEVL V, 60-74 MIN: ICD-10-PCS | Mod: S$GLB,,, | Performed by: INTERNAL MEDICINE

## 2023-11-08 PROCEDURE — 1159F PR MEDICATION LIST DOCUMENTED IN MEDICAL RECORD: ICD-10-PCS | Mod: CPTII,S$GLB,, | Performed by: INTERNAL MEDICINE

## 2023-11-08 PROCEDURE — 1101F PT FALLS ASSESS-DOCD LE1/YR: CPT | Mod: CPTII,S$GLB,, | Performed by: INTERNAL MEDICINE

## 2023-11-08 PROCEDURE — 3074F SYST BP LT 130 MM HG: CPT | Mod: CPTII,S$GLB,, | Performed by: INTERNAL MEDICINE

## 2023-11-08 PROCEDURE — 1126F PR PAIN SEVERITY QUANTIFIED, NO PAIN PRESENT: ICD-10-PCS | Mod: CPTII,S$GLB,, | Performed by: INTERNAL MEDICINE

## 2023-11-08 PROCEDURE — 1126F AMNT PAIN NOTED NONE PRSNT: CPT | Mod: CPTII,S$GLB,, | Performed by: INTERNAL MEDICINE

## 2023-11-08 PROCEDURE — 3288F PR FALLS RISK ASSESSMENT DOCUMENTED: ICD-10-PCS | Mod: CPTII,S$GLB,, | Performed by: INTERNAL MEDICINE

## 2023-11-08 PROCEDURE — 3079F DIAST BP 80-89 MM HG: CPT | Mod: CPTII,S$GLB,, | Performed by: INTERNAL MEDICINE

## 2023-11-08 PROCEDURE — 99999 PR PBB SHADOW E&M-EST. PATIENT-LVL III: ICD-10-PCS | Mod: PBBFAC,,, | Performed by: INTERNAL MEDICINE

## 2023-11-08 PROCEDURE — 3074F PR MOST RECENT SYSTOLIC BLOOD PRESSURE < 130 MM HG: ICD-10-PCS | Mod: CPTII,S$GLB,, | Performed by: INTERNAL MEDICINE

## 2023-11-08 PROCEDURE — 99205 OFFICE O/P NEW HI 60 MIN: CPT | Mod: S$GLB,,, | Performed by: INTERNAL MEDICINE

## 2023-11-09 NOTE — PROGRESS NOTES
Jace Dean is a 78 y.o. female for whom nephrology consult has been requested to evaluate and give opinion.   Renal clinic consult note:  Date of consult: 11/8/23  Reason for consult: CHAS  Referring physician: Dr. Cal Ngo    HPI: Thank you for referring the pt to us. H/o and chart were reviewed. Pt was seen and examined. Pt is a 77 y/o female with h/o of HTN who was referred to renal clinic after s Cr worsened to 2.0 from baseline of close to 1.2. This increase occurred in July 2023. At the time, pt was experiencing E coli UTI. Pt denies having used NSAIds, no cardiopulmonary symptoms. On repeat, s Cr has improved back to prior baseline. Pt has no new c/o's today, no discomfort, no abdominal pain, no fever, no back pain.       PAST MEDICAL HISTORY:  CKD stage 3, Hypertension, UTI (E coli).    PAST SURGICAL HISTORY:  She  has no past surgical history on file.    SOCIAL HISTORY:  She  reports that she has quit smoking. Her smoking use included cigarettes. She does not have any smokeless tobacco history on file. She reports that she does not drink alcohol.    FAMILY MEDICAL HISTORY:  Her family history includes Cataracts in her father and mother.    Review of patient's allergies indicates:   Allergen Reactions    Meperidine     Penicillins     Tetracyclines            Prior to Admission medications    Medication Sig Start Date End Date Taking? Authorizing Provider   albuterol (PROVENTIL) 2.5 mg /3 mL (0.083 %) nebulizer solution Take 2.5 mg by nebulization every 6 (six) hours as needed. 9/20/21  Yes Provider, Historical   albuterol sulfate (PROAIR RESPICLICK) 90 mcg/actuation inhaler Inhale into the lungs 4 (four) times daily as needed.   Yes Provider, Historical   calcium carbonate (CALCIUM 500 ORAL) Take by mouth.   Yes Provider, Historical   predniSONE (DELTASONE) 5 MG tablet Take 1 tablet (5 mg total) by mouth every other day. 7/3/23  Yes Cal Ngo MD   furosemide (LASIX) 20 MG tablet Take 1 tablet  "(20 mg total) by mouth daily as needed (edema).  Patient not taking: Reported on 11/8/2023 8/2/22   Edison Sun MD   mv-mn/iron/folic acid/herb 190 (VITAMIN D3 COMPLETE ORAL) Take by mouth.    Provider, Historical   mycophenolate (CELLCEPT) 500 mg Tab Take 2 tablets (1,000 mg total) by mouth 2 (two) times daily.  Patient not taking: Reported on 11/8/2023 7/3/23   Cal Ngo MD   simvastatin (ZOCOR) 40 MG tablet Take 1 tablet (40 mg total) by mouth every evening.  Patient not taking: Reported on 11/8/2023 7/3/23   Cal Ngo MD        REVIEW OF SYSTEMS:  Patient has no fever, fatigue, visual changes, chest pain, edema, cough, dyspnea, nausea, vomiting, constipation, diarrhea, arthralgias, pruritis, dizziness, weakness, depression, confusion.    PHYSICAL EXAM:   height is 5' 3" (1.6 m) and weight is 79.4 kg (175 lb). Her blood pressure is 120/84 and her pulse is 84. Her respiration is 22 (abnormal).   Gen: WDWN female in no apparent distress  Psych: Normal mood and affect  Skin: No rashes or ulcers  Neck: No JVD  Chest: Clear with no rales, rhonchi, wheezing with normal effort  CV: Regular with no murmurs, gallops or rubs  Abd: Soft, nontender, no distension  Ext: No edema    Labs reviewed  BMP  Lab Results   Component Value Date     11/06/2023    K 4.3 11/06/2023     11/06/2023    CO2 25 11/06/2023    BUN 31 (H) 11/06/2023    CREATININE 1.2 11/06/2023    CALCIUM 9.7 11/06/2023    ANIONGAP 10 11/06/2023    EGFRNORACEVR 45.8 (A) 11/06/2023     Lab Results   Component Value Date    WBC 6.67 11/06/2023    HGB 13.0 11/06/2023    HCT 40.8 11/06/2023    MCV 96 11/06/2023     11/06/2023     U/a in July 2023: 1+ protein, 3+blood, large pyuria      IMPRESSION AND RECOMMENDATIONS: 77 y/o female presents for the evaluation of CHAS:    Renal: s Cr has already improved. CHAS has resolved  Suspect CHAS was secondary to E coli UTI and infection  K normal  Na normal  Acid base no issues  Ca " normal  Proteinuria and hematuria likely related to prior UTI    Has CKD stage 3 at baseline  Likely secondary to HTN and age related decline in kidney function    2. HTN: BP controlled  Meds reviewed    3. UTI: has reseolved  No symptoms, no active issue    4. Med review, was done.    Plans and recommendations:  As discussed above  Total time spent 45 minutes including time needed to review the records, the   patient evaluation, documentation, face-to-face discussion with the patient,   more than 50% of the time was spent on coordination of care and counseling.    Level IV visit.  RTC 1 year or as needed  Consult note was sent back to the referring MD Braden Callejas MD

## 2025-06-18 ENCOUNTER — OFFICE VISIT (OUTPATIENT)
Dept: OPHTHALMOLOGY | Facility: CLINIC | Age: 80
End: 2025-06-18
Payer: MEDICARE

## 2025-06-18 DIAGNOSIS — H52.7 REFRACTIVE ERRORS: ICD-10-CM

## 2025-06-18 DIAGNOSIS — H25.13 CATARACT, NUCLEAR SCLEROTIC SENILE, BILATERAL: Primary | ICD-10-CM

## 2025-06-18 PROCEDURE — 92014 COMPRE OPH EXAM EST PT 1/>: CPT | Mod: S$GLB,,, | Performed by: OPTOMETRIST

## 2025-06-18 PROCEDURE — 99999 PR PBB SHADOW E&M-EST. PATIENT-LVL II: CPT | Mod: PBBFAC,,, | Performed by: OPTOMETRIST

## 2025-06-18 PROCEDURE — 92015 DETERMINE REFRACTIVE STATE: CPT | Mod: S$GLB,,, | Performed by: OPTOMETRIST

## 2025-06-18 PROCEDURE — 1159F MED LIST DOCD IN RCRD: CPT | Mod: CPTII,S$GLB,, | Performed by: OPTOMETRIST

## 2025-06-18 NOTE — PROGRESS NOTES
SUBJECTIVE  Jace Dean is 80 y.o. female  Corrected distance visual acuity was 20/25 in the right eye and 20/40 -2 in the left eye.   Chief Complaint   Patient presents with    Annual Exam     Complete ocular exam  Last exam 05/04/21      Patient states she feels her astigmatism may have worsened since her last visit. Patients most current glasses broke and wearing an old pair. Also at times OU tears and when she wakes up OD is matted at the base of lashes.          HPI     Annual Exam     Additional comments: Complete ocular exam  Last exam 05/04/21      Patient states she feels her astigmatism may have worsened since her last   visit. Patients most current glasses broke and wearing an old pair. Also   at times OU tears and when she wakes up OD is matted at the base of   lashes.           Comments    1. Cataracts OU  2. Refractive error             Last edited by Monae Sorto, Patient Care Assistant on 6/18/2025  1:38   PM.         Assessment /Plan :  1. Cataract, nuclear sclerotic senile, bilateral  Significant cataracts OU, discussed cataract surgery including Specialty IOL's  Referred to Dr. Escobar  Consult Ophthalmology for cataract evaluation at next available appointment.    2. Refractive errors  RTC PRN  Discussed above and answered questions.

## 2025-07-22 ENCOUNTER — DOCUMENTATION ONLY (OUTPATIENT)
Dept: OPHTHALMOLOGY | Facility: CLINIC | Age: 80
End: 2025-07-22

## 2025-07-22 ENCOUNTER — OFFICE VISIT (OUTPATIENT)
Dept: OPHTHALMOLOGY | Facility: CLINIC | Age: 80
End: 2025-07-22
Payer: MEDICARE

## 2025-07-22 DIAGNOSIS — H25.11 NUCLEAR SCLEROSIS OF RIGHT EYE: Primary | ICD-10-CM

## 2025-07-22 DIAGNOSIS — H04.123 DRY EYES, BILATERAL: ICD-10-CM

## 2025-07-22 DIAGNOSIS — H35.3131 NONEXUDATIVE AGE-RELATED MACULAR DEGENERATION, BILATERAL, EARLY DRY STAGE: ICD-10-CM

## 2025-07-22 DIAGNOSIS — H25.12 NUCLEAR SCLEROSIS OF LEFT EYE: ICD-10-CM

## 2025-07-22 PROCEDURE — 99999 PR PBB SHADOW E&M-EST. PATIENT-LVL III: CPT | Mod: PBBFAC,,, | Performed by: STUDENT IN AN ORGANIZED HEALTH CARE EDUCATION/TRAINING PROGRAM

## 2025-07-22 RX ORDER — PREDNISOLONE ACETATE 10 MG/ML
1 SUSPENSION/ DROPS OPHTHALMIC 4 TIMES DAILY
Qty: 5 ML | Refills: 1 | Status: SHIPPED | OUTPATIENT
Start: 2025-07-22 | End: 2026-07-22

## 2025-07-22 NOTE — PROGRESS NOTES
HPI    C/o blurred vision , glare, trouble driving at night , difficulty reading,   and seeing the television over the past several years    Which eye is most affected? OU    Activities of daily living impacted: Yes    Do you have difficulty, even with glasses, with the following activities?    Reading small print such as labels on medicine bottles, a telephone book,   or food labels?  yes  Reading a newspaper or book?  yes  Seeing steps, stairs, or curbs?  no  Reading traffic signs, street signs, or store signs?  no  Writing checks or filling out forms?  no  Watching television?  no  Driving at night?  yes    Do you have any upcoming procedures or surgeries?  no    Have you had any eye surgeries including LASIK or PRK?  no (if so, what kind)    Do you have [employer] insurance?   no (if so, notify MD for potential toric lens)       1. Cataracts OU  2. Refractive error  Last edited by Marcella Graham on 7/22/2025  1:10 PM.            Assessment /Plan     For exam results, see Encounter Report.    Nuclear sclerosis of right eye- Follow    Nuclear sclerosis of left eye- Visually Significant Cataract OU  Patient reports decreased vision consistent with the clinical amount of lenticular opacity, which reaches the level of visual significance and affects activities of daily living including reading and glare. Risks, benefits, and alternatives to cataract surgery were discussed.  Discussion of risks included possibility of infection as well as permanent vision loss.The pt expressed a desire to proceed with surgery with the potential for some reasonable degree of visual improvement. Recommended regular use of artificial tears and good lid hygiene to optimize surgical outcome.     Discussed IOL options and refractive outcomes for this patient.    Phaco left eye, Topical    Additional considerations:   Kenalog    Will aim for Monofocal - Distance IOL    Post op gtts:   PF      Discussed that vision may be limited by:  Astigmatism  >1D, mild AMD    Explained that patient may need glasses after surgery.    RTC for POD#1      Dry eyes, bilateral- ATs QID and lid hygiene w/ baby shampoo    Mild, AMD OU- Discussed clinical condition.   - Avoid of tobacco products  - UV light protection and green leafy vegetables  - Amsler grid  - Patient advised to call immediately if any visual changes occur. Regular follow up recommended.

## 2025-07-22 NOTE — PROGRESS NOTES
Short Stay Record    Diagnosis: Nuclear Sclerotic Cataract left    CC: Blurry Vision     HPI:  Jace Dean is a 80 y.o. female who presents for evaluation prior to ophthalmic surgery. No current complaints.     Past Medical History:   Diagnosis Date    Hypertension      No past surgical history on file.  Social History     Tobacco Use    Smoking status: Former     Types: Cigarettes    Smokeless tobacco: Never   Substance Use Topics    Alcohol use: No     Comment: OCC     Family History   Problem Relation Name Age of Onset    Cataracts Father      Cataracts Mother       Review of patient's allergies indicates:   Allergen Reactions    Amoxil [amoxicillin] Dermatitis and Rash    Pontocaine [tetracaine hcl] Palpitations     Heart palpations when she had it injection to have some teeth extracted.     Meperidine     Penicillins     Tetracyclines        Current Medications[1]    Review of Systems:  10 Pt ROS negative except as stated in HPI    Physical Exam:  General Appearance:    A&Ox3, no distress, appears stated age   Head:    Normocephalic, without obvious abnormality, atraumatic   Eyes:    PERRL, EOM's intact   Back:     Symmetric, no curvature   Lungs:     respirations unlabored   Chest Wall:    No tenderness or deformity    Heart:  Abdomen:  Extremities:  Skin:    S1 and S2 present    Soft, non-tender    Extremities normal, atraumatic    Skin color, texture, turgor normal     Patient is stable for ophthalmic surgery under local and MAC.       _           [1]   Current Outpatient Medications:     albuterol (PROVENTIL) 2.5 mg /3 mL (0.083 %) nebulizer solution, Take 2.5 mg by nebulization every 6 (six) hours as needed. (Patient not taking: Reported on 7/22/2025), Disp: , Rfl:     albuterol sulfate (PROAIR RESPICLICK) 90 mcg/actuation inhaler, Inhale into the lungs 4 (four) times daily as needed. (Patient not taking: Reported on 7/22/2025), Disp: , Rfl:     calcium carbonate (CALCIUM 500 ORAL), Take by mouth.  (Patient not taking: Reported on 7/22/2025), Disp: , Rfl:     furosemide (LASIX) 20 MG tablet, Take 1 tablet (20 mg total) by mouth daily as needed (edema). (Patient not taking: Reported on 7/22/2025), Disp: 90 tablet, Rfl: 0    mv-mn/iron/folic acid/herb 190 (VITAMIN D3 COMPLETE ORAL), Take by mouth. (Patient not taking: Reported on 7/22/2025), Disp: , Rfl:     mycophenolate (CELLCEPT) 500 mg Tab, Take 2 tablets (1,000 mg total) by mouth 2 (two) times daily. (Patient not taking: Reported on 7/22/2025), Disp: 360 tablet, Rfl: 2    prednisoLONE acetate (PRED FORTE) 1 % DrpS, Place 1 drop into the left eye 4 (four) times daily., Disp: 5 mL, Rfl: 1    predniSONE (DELTASONE) 5 MG tablet, Take 1 tablet (5 mg total) by mouth every other day. (Patient not taking: Reported on 7/22/2025), Disp: 45 tablet, Rfl: 1    simvastatin (ZOCOR) 40 MG tablet, Take 1 tablet (40 mg total) by mouth every evening. (Patient not taking: Reported on 7/22/2025), Disp: 90 tablet, Rfl: 3

## 2025-08-07 ENCOUNTER — OFFICE VISIT (OUTPATIENT)
Dept: OPHTHALMOLOGY | Facility: CLINIC | Age: 80
End: 2025-08-07
Payer: MEDICARE

## 2025-08-07 DIAGNOSIS — Z98.890 POST-OPERATIVE STATE: Primary | ICD-10-CM

## 2025-08-07 PROCEDURE — 99999 PR PBB SHADOW E&M-EST. PATIENT-LVL II: CPT | Mod: PBBFAC,,, | Performed by: OPHTHALMOLOGY

## 2025-08-07 NOTE — PROGRESS NOTES
SUBJECTIVE  Jace Dean is 80 y.o. female  Uncorrected distance visual acuity was not recorded in the right eye and 20/20 -2 in the left eye.   Chief Complaint   Patient presents with    Post-op Evaluation     POD#1 s/p CEIOL OS. Has received appropriate post-op drops. Denies any discomfort. No new ocular complaints.            HPI     Post-op Evaluation     Additional comments: POD#1 s/p CEIOL OS. Has received appropriate post-op   drops. Denies any discomfort. No new ocular complaints.             Comments    PCIOL OS 8/6/25    1. Cataract OD  2. Refractive error    Pred QID             Last edited by Rita Oliveira on 8/7/2025  9:30 AM.         Assessment Plan :  1. Post-operative state Exam:  SLE:  S/C: normal  K : trace k fold  AC: trace cell and flare  Iris: normal  Lens: PCIOL    IMP:  PO Day 1 S/P Phaco/IOL OS : Doing well.    Plan:  Continue gtts to operative eye:  Pred 1% QID    Reinstructed in importance of absolute compliance with Post-OP instructions including medications, shield at bedtime, and limitation of activities. Follow up appointments in approximately one and six weeks or call immediately for increased pain, redness or vision loss.

## 2025-08-14 ENCOUNTER — TELEPHONE (OUTPATIENT)
Dept: OPHTHALMOLOGY | Facility: CLINIC | Age: 80
End: 2025-08-14
Payer: MEDICARE

## 2025-08-15 ENCOUNTER — OFFICE VISIT (OUTPATIENT)
Dept: OPHTHALMOLOGY | Facility: CLINIC | Age: 80
End: 2025-08-15
Payer: MEDICARE

## 2025-08-15 DIAGNOSIS — Z98.890 POST-OPERATIVE STATE: Primary | ICD-10-CM

## 2025-08-15 DIAGNOSIS — Z96.1 PSEUDOPHAKIA OF LEFT EYE: ICD-10-CM

## 2025-08-15 DIAGNOSIS — H25.12 NUCLEAR SCLEROSIS OF LEFT EYE: ICD-10-CM

## 2025-08-15 PROCEDURE — 99999 PR PBB SHADOW E&M-EST. PATIENT-LVL II: CPT | Mod: PBBFAC,,, | Performed by: OPTOMETRIST

## 2025-08-15 RX ORDER — PREDNISOLONE ACETATE 10 MG/ML
SUSPENSION/ DROPS OPHTHALMIC
Qty: 5 ML | Refills: 1 | Status: SHIPPED | OUTPATIENT
Start: 2025-08-15 | End: 2025-09-05

## 2025-08-15 RX ORDER — PREDNISOLONE ACETATE 10 MG/ML
1 SUSPENSION/ DROPS OPHTHALMIC 4 TIMES DAILY
Qty: 5 ML | Refills: 1 | Status: CANCELLED | OUTPATIENT
Start: 2025-08-15 | End: 2026-08-15